# Patient Record
Sex: MALE | Race: OTHER | Employment: OTHER | URBAN - METROPOLITAN AREA
[De-identification: names, ages, dates, MRNs, and addresses within clinical notes are randomized per-mention and may not be internally consistent; named-entity substitution may affect disease eponyms.]

---

## 2020-12-07 ENCOUNTER — HOSPITAL ENCOUNTER (EMERGENCY)
Facility: HOSPITAL | Age: 80
Discharge: HOME | End: 2020-12-07
Attending: EMERGENCY MEDICINE
Payer: MEDICAID

## 2020-12-07 ENCOUNTER — APPOINTMENT (OUTPATIENT)
Dept: CT IMAGING | Facility: HOSPITAL | Age: 80
End: 2020-12-07
Attending: EMERGENCY MEDICINE
Payer: MEDICAID

## 2020-12-07 ENCOUNTER — APPOINTMENT (OUTPATIENT)
Dept: GENERAL RADIOLOGY | Facility: HOSPITAL | Age: 80
End: 2020-12-07
Attending: EMERGENCY MEDICINE
Payer: MEDICAID

## 2020-12-07 VITALS
OXYGEN SATURATION: 97 % | HEART RATE: 59 BPM | TEMPERATURE: 99 F | HEIGHT: 67 IN | WEIGHT: 170 LBS | SYSTOLIC BLOOD PRESSURE: 154 MMHG | RESPIRATION RATE: 18 BRPM | BODY MASS INDEX: 26.68 KG/M2 | DIASTOLIC BLOOD PRESSURE: 61 MMHG

## 2020-12-07 DIAGNOSIS — E87.1 HYPONATREMIA: ICD-10-CM

## 2020-12-07 DIAGNOSIS — S20.212A CONTUSION OF LEFT CHEST WALL, INITIAL ENCOUNTER: Primary | ICD-10-CM

## 2020-12-07 DIAGNOSIS — R55 SYNCOPE AND COLLAPSE: ICD-10-CM

## 2020-12-07 DIAGNOSIS — E87.5 HYPERKALEMIA: ICD-10-CM

## 2020-12-07 PROCEDURE — 99285 EMERGENCY DEPT VISIT HI MDM: CPT

## 2020-12-07 PROCEDURE — 96374 THER/PROPH/DIAG INJ IV PUSH: CPT

## 2020-12-07 PROCEDURE — 93010 ELECTROCARDIOGRAM REPORT: CPT

## 2020-12-07 PROCEDURE — 80053 COMPREHEN METABOLIC PANEL: CPT | Performed by: EMERGENCY MEDICINE

## 2020-12-07 PROCEDURE — 85025 COMPLETE CBC W/AUTO DIFF WBC: CPT | Performed by: EMERGENCY MEDICINE

## 2020-12-07 PROCEDURE — 93005 ELECTROCARDIOGRAM TRACING: CPT

## 2020-12-07 PROCEDURE — 71101 X-RAY EXAM UNILAT RIBS/CHEST: CPT | Performed by: EMERGENCY MEDICINE

## 2020-12-07 PROCEDURE — 70450 CT HEAD/BRAIN W/O DYE: CPT | Performed by: EMERGENCY MEDICINE

## 2020-12-07 RX ORDER — LOSARTAN POTASSIUM 50 MG/1
TABLET ORAL 2 TIMES DAILY
COMMUNITY

## 2020-12-07 RX ORDER — METOPROLOL SUCCINATE 25 MG/1
25 TABLET, EXTENDED RELEASE ORAL DAILY
COMMUNITY

## 2020-12-07 RX ORDER — ASPIRIN 81 MG/1
81 TABLET ORAL DAILY
COMMUNITY

## 2020-12-07 RX ORDER — ATORVASTATIN CALCIUM 20 MG/1
20 TABLET, FILM COATED ORAL NIGHTLY
COMMUNITY

## 2020-12-07 RX ORDER — KETOROLAC TROMETHAMINE 30 MG/ML
15 INJECTION, SOLUTION INTRAMUSCULAR; INTRAVENOUS ONCE
Status: COMPLETED | OUTPATIENT
Start: 2020-12-07 | End: 2020-12-07

## 2020-12-07 RX ORDER — MEMANTINE HYDROCHLORIDE 10 MG/1
10 TABLET ORAL 2 TIMES DAILY
COMMUNITY

## 2020-12-07 RX ORDER — NAPROXEN 500 MG/1
500 TABLET ORAL 2 TIMES DAILY PRN
Qty: 20 TABLET | Refills: 0 | Status: ON HOLD | OUTPATIENT
Start: 2020-12-07 | End: 2020-12-10

## 2020-12-07 NOTE — ED PROVIDER NOTES
Patient Seen in: [facility] Emergency Department      History   Patient presents with:  Fall    Stated Complaint: Fall around 1200 12/6/20 + LOC    HPI    50-year-old male who is primarily Latvian-speaking and his son is helping with translation pre Current:/56   Pulse 60   Temp 98.8 °F (37.1 °C) (Temporal)   Resp 16   Ht 170.2 cm (5' 7\")   Wt 77.1 kg   SpO2 99%   BMI 26.63 kg/m²         Physical Exam  Vitals signs and nursing note reviewed. Chaperone present: Son in room.    Constitutional: ---------                               -----------         ------                     CBC W/ DIFFERENTIAL[[id number]]          Abnormal            Final result                 Please view results for these tests on the individual orders.    URINALYSIS WITH PROCEDURE:  XR RIBS WITH CHEST (3 VIEWS), LEFT  (CPT=71101)  TECHNIQUE:  PA Chest and three views of the ribs were obtained  COMPARISON:  None.   INDICATIONS:  Fall around 1200 12/6/20 + LOC  PATIENT STATED HISTORY: (As transcribed by Technologist)  Per Polly Walter Patient was given a dose of Toradol. He overall is breathing comfortably and has a good O2 saturation. No evidence of any type of hypoxia.   There is no clear cause of the syncope and this could have been a fall or could have been related to perhaps some

## 2020-12-07 NOTE — ED INITIAL ASSESSMENT (HPI)
Per family, pt had an unwitnessed fall around noon yesterday in the shower. Pt had +LOC at this time. Today pt is c/o upper back and chest pain. Pt has small bruise to upper back.

## 2020-12-08 ENCOUNTER — HOSPITAL ENCOUNTER (INPATIENT)
Facility: HOSPITAL | Age: 80
LOS: 1 days | Discharge: HOME HEALTH | DRG: 184 | End: 2020-12-10
Attending: EMERGENCY MEDICINE | Admitting: INTERNAL MEDICINE
Payer: MEDICARE

## 2020-12-08 ENCOUNTER — APPOINTMENT (OUTPATIENT)
Dept: GENERAL RADIOLOGY | Facility: HOSPITAL | Age: 80
DRG: 184 | End: 2020-12-08
Attending: EMERGENCY MEDICINE
Payer: MEDICARE

## 2020-12-08 ENCOUNTER — APPOINTMENT (OUTPATIENT)
Dept: CT IMAGING | Facility: HOSPITAL | Age: 80
DRG: 184 | End: 2020-12-08
Attending: EMERGENCY MEDICINE
Payer: MEDICARE

## 2020-12-08 DIAGNOSIS — S22.42XD CLOSED FRACTURE OF MULTIPLE RIBS OF LEFT SIDE WITH ROUTINE HEALING, SUBSEQUENT ENCOUNTER: ICD-10-CM

## 2020-12-08 DIAGNOSIS — R07.89 CHEST PAIN, ATYPICAL: ICD-10-CM

## 2020-12-08 DIAGNOSIS — R55 SYNCOPE AND COLLAPSE: Primary | ICD-10-CM

## 2020-12-08 PROCEDURE — 74177 CT ABD & PELVIS W/CONTRAST: CPT | Performed by: EMERGENCY MEDICINE

## 2020-12-08 PROCEDURE — 99223 1ST HOSP IP/OBS HIGH 75: CPT | Performed by: INTERNAL MEDICINE

## 2020-12-08 PROCEDURE — 71260 CT THORAX DX C+: CPT | Performed by: EMERGENCY MEDICINE

## 2020-12-08 PROCEDURE — 71045 X-RAY EXAM CHEST 1 VIEW: CPT | Performed by: EMERGENCY MEDICINE

## 2020-12-08 RX ORDER — ASPIRIN 81 MG/1
324 TABLET, CHEWABLE ORAL ONCE
Status: COMPLETED | OUTPATIENT
Start: 2020-12-08 | End: 2020-12-08

## 2020-12-08 RX ORDER — MORPHINE SULFATE 2 MG/ML
2 INJECTION, SOLUTION INTRAMUSCULAR; INTRAVENOUS ONCE
Status: COMPLETED | OUTPATIENT
Start: 2020-12-08 | End: 2020-12-08

## 2020-12-09 ENCOUNTER — APPOINTMENT (OUTPATIENT)
Dept: CV DIAGNOSTICS | Facility: HOSPITAL | Age: 80
DRG: 184 | End: 2020-12-09
Attending: INTERNAL MEDICINE
Payer: MEDICARE

## 2020-12-09 PROBLEM — S22.42XD CLOSED FRACTURE OF MULTIPLE RIBS OF LEFT SIDE WITH ROUTINE HEALING, SUBSEQUENT ENCOUNTER: Status: ACTIVE | Noted: 2020-12-09

## 2020-12-09 PROBLEM — R07.89 CHEST PAIN, ATYPICAL: Status: ACTIVE | Noted: 2020-12-09

## 2020-12-09 PROCEDURE — 93306 TTE W/DOPPLER COMPLETE: CPT | Performed by: INTERNAL MEDICINE

## 2020-12-09 PROCEDURE — 99232 SBSQ HOSP IP/OBS MODERATE 35: CPT | Performed by: INTERNAL MEDICINE

## 2020-12-09 RX ORDER — MEMANTINE HYDROCHLORIDE 10 MG/1
10 TABLET ORAL 2 TIMES DAILY
Status: DISCONTINUED | OUTPATIENT
Start: 2020-12-09 | End: 2020-12-10

## 2020-12-09 RX ORDER — ONDANSETRON 2 MG/ML
4 INJECTION INTRAMUSCULAR; INTRAVENOUS EVERY 6 HOURS PRN
Status: DISCONTINUED | OUTPATIENT
Start: 2020-12-09 | End: 2020-12-10

## 2020-12-09 RX ORDER — ENOXAPARIN SODIUM 100 MG/ML
40 INJECTION SUBCUTANEOUS DAILY
Status: DISCONTINUED | OUTPATIENT
Start: 2020-12-09 | End: 2020-12-10

## 2020-12-09 RX ORDER — SODIUM CHLORIDE 9 MG/ML
INJECTION, SOLUTION INTRAVENOUS CONTINUOUS
Status: DISCONTINUED | OUTPATIENT
Start: 2020-12-09 | End: 2020-12-09

## 2020-12-09 RX ORDER — LOSARTAN POTASSIUM 50 MG/1
50 TABLET ORAL 2 TIMES DAILY
Status: DISCONTINUED | OUTPATIENT
Start: 2020-12-09 | End: 2020-12-10

## 2020-12-09 RX ORDER — ACETAMINOPHEN 325 MG/1
650 TABLET ORAL EVERY 6 HOURS PRN
Status: DISCONTINUED | OUTPATIENT
Start: 2020-12-09 | End: 2020-12-10

## 2020-12-09 RX ORDER — ASPIRIN 81 MG/1
81 TABLET ORAL DAILY
Status: DISCONTINUED | OUTPATIENT
Start: 2020-12-09 | End: 2020-12-10

## 2020-12-09 RX ORDER — ATORVASTATIN CALCIUM 20 MG/1
20 TABLET, FILM COATED ORAL NIGHTLY
Status: DISCONTINUED | OUTPATIENT
Start: 2020-12-09 | End: 2020-12-10

## 2020-12-09 RX ORDER — METOPROLOL SUCCINATE 25 MG/1
25 TABLET, EXTENDED RELEASE ORAL
Status: DISCONTINUED | OUTPATIENT
Start: 2020-12-10 | End: 2020-12-10

## 2020-12-09 RX ORDER — ACETAMINOPHEN AND CODEINE PHOSPHATE 300; 30 MG/1; MG/1
1 TABLET ORAL EVERY 4 HOURS PRN
Status: DISCONTINUED | OUTPATIENT
Start: 2020-12-09 | End: 2020-12-10

## 2020-12-09 NOTE — ED INITIAL ASSESSMENT (HPI)
Pt from [facility], instructed to go to er, pt had ct neg, xray ribs no fractures, pt has 10/10 pain lt side ribs, pt fell last Sunday in shower, pt c/o rib and lt shoulder pain , pt aox4, pt able to walk steady and unassisted at home, pt denies dizziness,

## 2020-12-09 NOTE — PHYSICAL THERAPY NOTE
Order received for Physical Therapy. Patient currently receiving echo at bedside will reattempt PT services as able.

## 2020-12-09 NOTE — PLAN OF CARE
Patient alert and oriented x4, Wolof speaking. On RA. NSR on tele. Continent of bowel and bladder. Complained of Left rib pain, ordered PRN pain medication administered with some relief. POC pain control. Fall precautions in place.  Call light within reac part  Description: INTERVENTIONS:  - Support and protect limb and body alignment per provider's orders  - Instruct and reinforce with patient and family use of appropriate assistive device and precautions (e.g. spinal or hip dislocation precautions)  Alexa Foley

## 2020-12-09 NOTE — H&P
[facility]  History and Physical     Luna Singh Patient Status:  Emergency    5/15/1940 MRN [id number]   Location [facility] Attending David Odom MD   Hosp Day # 0 PCP None Pcp     Chief Complaint: left si (500 mg total) by mouth 2 (two) times daily as needed. , Disp: 20 tablet, Rfl: 0        Review of Systems:   A comprehensive 14 point review of systems was completed. Pertinent positives and negatives noted in the HPI.     Physical Exam:    /72   Pu 5. echo  6. IVF  7. PT OT   8. Fall precaution   2. Chest pain due to multiple rib fractures   1. Pain control   2. IS  3. PTOT  3. Hyponatremia - IVF  Atelectasis   4. HTN   5. HL  6.  Mild cognitive impairment per son he has become more forgetful over t

## 2020-12-09 NOTE — HOME CARE LIAISON
Received referral from [address] [place] [address]. Met with patient and patients son Kenneth at the bedside and provided choice. Patient is agreeable to [facility]. Residential brochure provided with contact information.  All questions addressed and ans

## 2020-12-09 NOTE — PHYSICAL THERAPY NOTE
PHYSICAL THERAPY EVALUATION - INPATIENT     Room Number: 2609/2609-A  Evaluation Date: 12/9/2020  Type of Evaluation: Initial  Physician Order: PT Eval and Treat    Presenting Problem: s/p fall admit with chest wall pain  Reason for Therapy: Mobility follows multistep commands with repetition  · Safety Judgement:  decreased awareness of need for safety    RANGE OF MOTION AND STRENGTH ASSESSMENT  Upper extremity ROM is WFL    Lower extremity ROM is within functional limits     Lower extremity strength i with cga for balance. Discussed with patient and son need to be up n chair and ambulate on unit with rw and assist and rw and HHPT for discharge.      Exercise/Education Provided:  Bed mobility  Body mechanics  Gait training  Transfer training  discharge p supervision  To rw   Goal #3 Patient is able to ambulate 200 feet with assist device: walker - rolling at assistance level: supervision     Goal #4    Goal #5    Goal #6    Goal Comments: Goals established on 12/9/2020  *Therapist wore surgical mask, glove

## 2020-12-09 NOTE — ED PROVIDER NOTES
Patient Seen in: [facility] Emergency Department      History   Patient presents with:  Trauma    Stated Complaint: rib pain, seen here yesterday for syncope/fall    HPI    Patient is an 79-year-old male does not take blood thinners as per patient's Vitals [12/08/20 1826]   BP (!) 175/65   Pulse 61   Resp 18   Temp 97.9 °F (36.6 °C)   Temp src Temporal   SpO2 95 %   O2 Device None (Room air)       Current:/72   Pulse 64   Temp 97.9 °F (36.6 °C) (Temporal)   Resp 19   Wt 77.1 kg   SpO2 100%   BMI (*)     BUN 24 (*)     GFR, Non-African American 52 (*)     Alkaline Phosphatase 39 (*)     All other components within normal limits   CBC W/ DIFFERENTIAL - Abnormal; Notable for the following components:    RBC 3.71 (*)     HGB 11.5 (*)     HCT 34.1 (*) mesenteric fat. No evidence for bowel obstruction or strangulation. 7. Mild compression fracture deformity of L4 of indeterminate age. Correlate with physical exam and history.     Dictated by (CST): Oswaldo Vogt MD on 12/08/2020 at 11:12 PM     Fin diagnosis)  Chest pain, atypical  Closed fracture of multiple ribs of left side with routine healing, subsequent encounter    Disposition:  Admit  12/8/2020 11:06 pm    Follow-up:  No follow-up provider specified.         Medications Prescribed:  Current Phil Ortiz

## 2020-12-09 NOTE — PROGRESS NOTES
[facility]  Progress Note     Barbara Carrillo Patient Status:  Inpatient    5/15/1940 MRN [id number]   [facility] 2NE-A Attending Fabien Belcher, DO   Hosp Day # 0 PCP None Pcp     Chief Complaint:  Left sided chest wall pain    m precaution   2. Chest pain due to multiple rib fractures   1. Pain control   2. IS/ flutter   3. PTOT  eval P   3. Hyponatremia -  Na 134   Atelectasis   4. HTN   5. HL  6.  Mild cognitive impairment per son he has become more forgetful over the recent year

## 2020-12-09 NOTE — CM/SW NOTE
12/09/20 1100   CM/SW Referral Data   Referral Source Physician   Reason for Referral Discharge planning   Informant Children   Patient Info   Patient's Mental Status Alert;Oriented   Patient Communication Issues Language barrier   Patient's Home [employer]

## 2020-12-10 VITALS
HEART RATE: 59 BPM | SYSTOLIC BLOOD PRESSURE: 157 MMHG | OXYGEN SATURATION: 97 % | WEIGHT: 170 LBS | RESPIRATION RATE: 18 BRPM | BODY MASS INDEX: 27 KG/M2 | DIASTOLIC BLOOD PRESSURE: 55 MMHG | TEMPERATURE: 98 F

## 2020-12-10 PROCEDURE — 99239 HOSP IP/OBS DSCHRG MGMT >30: CPT | Performed by: INTERNAL MEDICINE

## 2020-12-10 RX ORDER — HYDROMORPHONE HYDROCHLORIDE 1 MG/ML
0.3 INJECTION, SOLUTION INTRAMUSCULAR; INTRAVENOUS; SUBCUTANEOUS EVERY 2 HOUR PRN
Status: DISCONTINUED | OUTPATIENT
Start: 2020-12-10 | End: 2020-12-10

## 2020-12-10 RX ORDER — AMLODIPINE BESYLATE 2.5 MG/1
2.5 TABLET ORAL 2 TIMES DAILY
COMMUNITY

## 2020-12-10 RX ORDER — ACETAMINOPHEN 500 MG
1000 TABLET ORAL EVERY 6 HOURS
Status: DISCONTINUED | OUTPATIENT
Start: 2020-12-10 | End: 2020-12-10

## 2020-12-10 RX ORDER — HYDROCODONE BITARTRATE AND ACETAMINOPHEN 7.5; 325 MG/1; MG/1
1-2 TABLET ORAL EVERY 6 HOURS PRN
Qty: 25 TABLET | Refills: 0 | Status: SHIPPED | OUTPATIENT
Start: 2020-12-10

## 2020-12-10 RX ORDER — OXYCODONE HYDROCHLORIDE 5 MG/1
5 TABLET ORAL EVERY 4 HOURS PRN
Status: DISCONTINUED | OUTPATIENT
Start: 2020-12-10 | End: 2020-12-10

## 2020-12-10 RX ORDER — HYDROMORPHONE HYDROCHLORIDE 1 MG/ML
0.6 INJECTION, SOLUTION INTRAMUSCULAR; INTRAVENOUS; SUBCUTANEOUS EVERY 2 HOUR PRN
Status: DISCONTINUED | OUTPATIENT
Start: 2020-12-10 | End: 2020-12-10

## 2020-12-10 NOTE — PLAN OF CARE
Assumed care at 1900, pt resting in bed w son at bedside. A&Ox4, forgetful. Indonesian speaking. Denies SOB, O2 sat WNL on RA. Denies chest pain, NSR on tele. Complaining of pain on left side. Tylenol #3 given. SCDs off, lovenox scheduled.  [facility] MUSCULOSKELETAL - ADULT  Goal: Return mobility to safest level of function  Description: INTERVENTIONS:  - Assess patient stability and activity tolerance for standing, transferring and ambulating w/ or w/o assistive devices  - Assist with transfers and am or patient reports new pain  - Anticipate increased pain with activity and pre-medicate as appropriate  Outcome: Progressing

## 2020-12-10 NOTE — PROGRESS NOTES
[facility]  Progress Note     Meet Forth Patient Status:  Inpatient    5/15/1940 MRN [id number]   [facility] 2NE-A Attending Sergey Hurley DO   Hosp Day # 1 PCP None Pcp     Chief Complaint:  Left sided chest wall pain    m 9. 2   ALB 3.6 3.9  --   --    * 130* 134* 131*   K 5.3* 5.1 4.5 4.8    101 102 98   CO2 25.0 24.0 28.0 27.0   ALKPHO 42* 39*  --   --    AST 18 19  --   --    ALT 25 24  --   --    BILT 0.7 1.0  --   --    TP 7.2 7.5  --   --      Estimated Cre was  normal.   5. Pulmonary arteries: Systolic pressure was mildly increased, estimated to be 37mm Hg.      Home with Norco can use Motrin or Tylenol also for pain  Blood pressure is been on the higher side will add low-dose Norvasc per discussion with phar

## 2020-12-10 NOTE — PLAN OF CARE
Sinus rhythm on tele  deny chest pain verbalized pain on left side of chest deny dyspnea  Per patient pain is just achiness walk around hallways on room air tolerated activity well  Good appetite  seen by jessica Horton  for discharge today  Discharge patient/family  Outcome: Progressing     Problem: MUSCULOSKELETAL - ADULT  Goal: Maintain proper alignment of affected body part  Description: INTERVENTIONS:  - Support and protect limb and body alignment per provider's orders  - Instruct and reinforce wit

## 2020-12-10 NOTE — BH PROGRESS NOTE
Discharge instructions completed w/ printed avs and prescriptions given to patient-verbalized understanding  [phone] discharge home accompanied by transporter

## 2020-12-11 ENCOUNTER — PATIENT OUTREACH (OUTPATIENT)
Dept: CASE MANAGEMENT | Age: 80
End: 2020-12-11

## 2020-12-11 NOTE — DISCHARGE SUMMARY
[facility] HOSPITALIST  DISCHARGE SUMMARY     Joel Bates Patient Status:  Inpatient    5/15/1940 MRN [id number]   [facility] 2NE-A Attending No att. providers found   Hosp Day # 1 PCP None Pcp     Date of Admission: 2020  Date of Dis pharmacy per his investigation patient was to start Norvasc 2.5 mg will have him started he said physician was from [facility] I do not know if he is practicing at the [facility]. We will have him fill prescription that he already has.       Elidia+ Score: 50  59-90 High daily.   Quantity: 30 patch  Refills: 0        CONTINUE taking these medications      Instructions Prescription details   amLODIPine Besylate 2.5 MG Tabs  Commonly known as: NORVASC      Take 2.5 mg by mouth daily.    Refills: 0     aspirin 81 MG Tbec edema.  -----------------------------------------------------------------------------------------------  PATIENT DISCHARGE INSTRUCTIONS: See electronic chart    Lori Cowart NP    Time spent:  > 30 minutes

## 2020-12-11 NOTE — PROGRESS NOTES
Attempted to reach the patient to complete a Hospital FU call. Left a message, with the use of the language line, for the pt to call NCM back at, [phone]. The number for the [facility] was also given to the patient to schedule at, [phone].

## 2020-12-14 NOTE — PROGRESS NOTES
NCM attempted to reach patient for HFU call, left VM on Hayti's phone patient's son, requesting a call back to [phone] with a condition update and also requesting patient call's [facility] clinic at [phone] to schedule a HFU appointment.

## 2020-12-18 ENCOUNTER — OFFICE VISIT (OUTPATIENT)
Dept: INTERNAL MEDICINE CLINIC | Facility: CLINIC | Age: 80
End: 2020-12-18
Payer: MEDICAID

## 2020-12-18 VITALS
BODY MASS INDEX: 25.27 KG/M2 | DIASTOLIC BLOOD PRESSURE: 58 MMHG | HEART RATE: 56 BPM | TEMPERATURE: 98 F | WEIGHT: 161 LBS | SYSTOLIC BLOOD PRESSURE: 162 MMHG | OXYGEN SATURATION: 98 % | RESPIRATION RATE: 18 BRPM | HEIGHT: 67 IN

## 2020-12-18 DIAGNOSIS — R56.9 SEIZURE (HCC): ICD-10-CM

## 2020-12-18 DIAGNOSIS — R55 SYNCOPE AND COLLAPSE: ICD-10-CM

## 2020-12-18 DIAGNOSIS — S22.42XA CLOSED FRACTURE OF MULTIPLE RIBS OF LEFT SIDE, INITIAL ENCOUNTER: ICD-10-CM

## 2020-12-18 DIAGNOSIS — E87.1 HYPONATREMIA: Primary | ICD-10-CM

## 2020-12-18 PROCEDURE — 3077F SYST BP >= 140 MM HG: CPT | Performed by: NURSE PRACTITIONER

## 2020-12-18 PROCEDURE — 85027 COMPLETE CBC AUTOMATED: CPT | Performed by: NURSE PRACTITIONER

## 2020-12-18 PROCEDURE — 99203 OFFICE O/P NEW LOW 30 MIN: CPT | Performed by: NURSE PRACTITIONER

## 2020-12-18 PROCEDURE — 3078F DIAST BP <80 MM HG: CPT | Performed by: NURSE PRACTITIONER

## 2020-12-18 PROCEDURE — 80048 BASIC METABOLIC PNL TOTAL CA: CPT | Performed by: NURSE PRACTITIONER

## 2020-12-18 PROCEDURE — 3008F BODY MASS INDEX DOCD: CPT | Performed by: NURSE PRACTITIONER

## 2020-12-18 PROCEDURE — 1111F DSCHRG MED/CURRENT MED MERGE: CPT | Performed by: NURSE PRACTITIONER

## 2020-12-18 RX ORDER — LEVETIRACETAM 500 MG/1
250 TABLET ORAL 2 TIMES DAILY
COMMUNITY
Start: 2020-12-18

## 2020-12-18 NOTE — PROGRESS NOTES
TRANSITIONAL CARE CLINIC PHARMACIST MEDICATION RECONCILIATION        [facility] Comment MRN [id number]    5/15/1940 PCP None Pcp       Comments: Medication history completed by the [address] [place] [address] Pharmacist with the patient and son in the office prescribed. Patient blood pressure was 162/58 in the office today. APN decided since the patient was taking the Amlodipine 2.5mg twice daily to continue that dose with his elevated systolic blood pressure.   Patient had a telephone visit with a neurologis

## 2020-12-18 NOTE — PATIENT INSTRUCTIONS
I will call w/ lab results later today   F/u w/ [facility] system for PCP     Keep  Norvasc 2.5 mg   In am and pm    Wean Norco as pain decreases       Change to tylenol if needed for pain

## 2020-12-18 NOTE — PROGRESS NOTES
Vicente Cowan 6      HISTORY   CHIEF COMPLAINT: Follow-up after mechanical fall  HPI: Johnynoa Moe is a 79year old male here today for hospital follow up for mechanical fall fractured ribs.   Johny Moe was History    Tobacco Use      Smoking status: Never Smoker      Smokeless tobacco: Never Used    Alcohol use: Never      Frequency: Never    Drug use: Never       Imaging & Consults:  Ct Brain Or Head (90050)    Result Date: 12/7/2020  CONCLUSION:  1.  No acu 12/7/2020  CONCLUSION:  1. Mild bibasilar subsegmental atelectatic changes. 2. No acute process. No evidence of focal osseous injury. Specifically, no discrete rib fractures are identified.     Dictated by (CST): Daija Zaragoza DO on 12/07/2020 at 2:49 PM denies history of anemia, bruising, bleeding  ENDOCRINE: denies increased or decreased appetite, sudden changes in weight, intolerance to heat or cold  ALLERGY/IMMUNOLOGY: denies angioedema, hives, itchy eyes, sneezing, hx of allergies or asthma    PHYSICA next Wednesday with follow-up in [facility] clinic    Hemoglobin improved 11 today. Spoke with grandson this afternoon, he has written instructions down will relay to his mother who does the medications for Uziel.   Spoke with the son Kenneth  at [phone] h assumption/resumption of care  ? Education given to patient and family on self-management, independent living, and activities of daily living. ?  Referrals as listed below in orders Assisted in scheduling required follow-up with community providers and ser

## 2020-12-21 ENCOUNTER — TELEPHONE (OUTPATIENT)
Dept: INTERNAL MEDICINE CLINIC | Facility: CLINIC | Age: 80
End: 2020-12-21

## 2020-12-21 DIAGNOSIS — E87.1 HYPONATREMIA: Primary | ICD-10-CM

## 2020-12-21 NOTE — TELEPHONE ENCOUNTER
Spoke with patient's son today regarding need for follow up BMP. Son states patient is seeing a PCP on Wednesday. Son is unsure who PCP is. BMP is recommended on Wednesday 12/23/2020.   Son states he will call the [facility] back with the name of the PCP so it

## 2020-12-22 NOTE — PROGRESS NOTES
Multiple attempts to contact the pt for a HFU without a patient call back. The patient was seen for a Hospital FU with the T[facility] on 12/18/2020. NCM closing encounter.

## 2020-12-23 NOTE — TELEPHONE ENCOUNTER
Pt's son returned call, states pt has appt with PCP Dr Mandy Cohen today at 11:00am. Jatinder Callow son to ask PCP to draw BMP, son verbalized intent to comply

## 2024-05-25 ENCOUNTER — HOSPITAL ENCOUNTER (INPATIENT)
Facility: HOSPITAL | Age: 84
LOS: 3 days | Discharge: HOME HEALTH | End: 2024-05-28
Attending: EMERGENCY MEDICINE | Admitting: HOSPITALIST
Payer: MEDICARE

## 2024-05-25 ENCOUNTER — APPOINTMENT (OUTPATIENT)
Dept: CT IMAGING | Facility: HOSPITAL | Age: 84
End: 2024-05-25
Attending: EMERGENCY MEDICINE
Payer: MEDICARE

## 2024-05-25 ENCOUNTER — APPOINTMENT (OUTPATIENT)
Dept: GENERAL RADIOLOGY | Facility: HOSPITAL | Age: 84
End: 2024-05-25
Attending: EMERGENCY MEDICINE
Payer: MEDICARE

## 2024-05-25 ENCOUNTER — HOSPITAL ENCOUNTER (INPATIENT)
Facility: HOSPITAL | Age: 84
LOS: 3 days | Discharge: HOME | End: 2024-05-28
Attending: EMERGENCY MEDICINE | Admitting: HOSPITALIST
Payer: MEDICARE

## 2024-05-25 DIAGNOSIS — R26.9 GAIT DISTURBANCE: ICD-10-CM

## 2024-05-25 DIAGNOSIS — I62.9 INTRACRANIAL HEMORRHAGE (HCC): Primary | ICD-10-CM

## 2024-05-25 LAB
ALBUMIN SERPL-MCNC: 4.1 G/DL (ref 3.4–5)
ALBUMIN/GLOB SERPL: 1.1 {RATIO} (ref 1–2)
ALP LIVER SERPL-CCNC: 59 U/L
ALT SERPL-CCNC: 24 U/L
ANION GAP SERPL CALC-SCNC: 7 MMOL/L (ref 0–18)
AST SERPL-CCNC: 26 U/L (ref 15–37)
ATRIAL RATE: 75 BPM
BASOPHILS # BLD AUTO: 0.05 X10(3) UL (ref 0–0.2)
BASOPHILS NFR BLD AUTO: 0.6 %
BILIRUB SERPL-MCNC: 0.6 MG/DL (ref 0.1–2)
BILIRUB UR QL STRIP.AUTO: NEGATIVE
BUN BLD-MCNC: 27 MG/DL (ref 9–23)
CALCIUM BLD-MCNC: 9.9 MG/DL (ref 8.5–10.1)
CHLORIDE SERPL-SCNC: 103 MMOL/L (ref 98–112)
CLARITY UR REFRACT.AUTO: CLEAR
CO2 SERPL-SCNC: 25 MMOL/L (ref 21–32)
COLOR UR AUTO: YELLOW
CREAT BLD-MCNC: 1.87 MG/DL
EGFRCR SERPLBLD CKD-EPI 2021: 35 ML/MIN/1.73M2 (ref 60–?)
EOSINOPHIL # BLD AUTO: 0.06 X10(3) UL (ref 0–0.7)
EOSINOPHIL NFR BLD AUTO: 0.7 %
ERYTHROCYTE [DISTWIDTH] IN BLOOD BY AUTOMATED COUNT: 12.7 %
EST. AVERAGE GLUCOSE BLD GHB EST-MCNC: 137 MG/DL (ref 68–126)
GLOBULIN PLAS-MCNC: 3.9 G/DL (ref 2.8–4.4)
GLUCOSE BLD-MCNC: 106 MG/DL (ref 70–99)
GLUCOSE BLD-MCNC: 132 MG/DL (ref 70–99)
GLUCOSE BLD-MCNC: 150 MG/DL (ref 70–99)
GLUCOSE UR STRIP.AUTO-MCNC: NORMAL MG/DL
HBA1C MFR BLD: 6.4 % (ref ?–5.7)
HCT VFR BLD AUTO: 37.9 %
HGB BLD-MCNC: 12.5 G/DL
IMM GRANULOCYTES # BLD AUTO: 0.02 X10(3) UL (ref 0–1)
IMM GRANULOCYTES NFR BLD: 0.2 %
KETONES UR STRIP.AUTO-MCNC: NEGATIVE MG/DL
LEUKOCYTE ESTERASE UR QL STRIP.AUTO: NEGATIVE
LYMPHOCYTES # BLD AUTO: 0.83 X10(3) UL (ref 1–4)
LYMPHOCYTES NFR BLD AUTO: 10 %
MCH RBC QN AUTO: 31.3 PG (ref 26–34)
MCHC RBC AUTO-ENTMCNC: 33 G/DL (ref 31–37)
MCV RBC AUTO: 94.8 FL
MONOCYTES # BLD AUTO: 0.72 X10(3) UL (ref 0.1–1)
MONOCYTES NFR BLD AUTO: 8.7 %
NEUTROPHILS # BLD AUTO: 6.59 X10 (3) UL (ref 1.5–7.7)
NEUTROPHILS # BLD AUTO: 6.59 X10(3) UL (ref 1.5–7.7)
NEUTROPHILS NFR BLD AUTO: 79.8 %
NITRITE UR QL STRIP.AUTO: NEGATIVE
OSMOLALITY SERPL CALC.SUM OF ELEC: 287 MOSM/KG (ref 275–295)
P AXIS: 38 DEGREES
P-R INTERVAL: 162 MS
PH UR STRIP.AUTO: 7.5 [PH] (ref 5–8)
PLATELET # BLD AUTO: 273 10(3)UL (ref 150–450)
POTASSIUM SERPL-SCNC: 5.5 MMOL/L (ref 3.5–5.1)
PROT SERPL-MCNC: 8 G/DL (ref 6.4–8.2)
PROT UR STRIP.AUTO-MCNC: NEGATIVE MG/DL
Q-T INTERVAL: 362 MS
QRS DURATION: 88 MS
QTC CALCULATION (BEZET): 404 MS
R AXIS: 54 DEGREES
RBC # BLD AUTO: 4 X10(6)UL
RBC UR QL AUTO: NEGATIVE
SODIUM SERPL-SCNC: 135 MMOL/L (ref 136–145)
SP GR UR STRIP.AUTO: 1.01 (ref 1–1.03)
T AXIS: -8 DEGREES
UROBILINOGEN UR STRIP.AUTO-MCNC: NORMAL MG/DL
VENTRICULAR RATE: 75 BPM
WBC # BLD AUTO: 8.3 X10(3) UL (ref 4–11)

## 2024-05-25 PROCEDURE — 70450 CT HEAD/BRAIN W/O DYE: CPT | Performed by: EMERGENCY MEDICINE

## 2024-05-25 PROCEDURE — 71045 X-RAY EXAM CHEST 1 VIEW: CPT | Performed by: EMERGENCY MEDICINE

## 2024-05-25 PROCEDURE — 99223 1ST HOSP IP/OBS HIGH 75: CPT | Performed by: HOSPITALIST

## 2024-05-25 RX ORDER — NICOTINE POLACRILEX 4 MG
15 LOZENGE BUCCAL
Status: DISCONTINUED | OUTPATIENT
Start: 2024-05-25 | End: 2024-05-28

## 2024-05-25 RX ORDER — DEXTROSE MONOHYDRATE 25 G/50ML
50 INJECTION, SOLUTION INTRAVENOUS
Status: DISCONTINUED | OUTPATIENT
Start: 2024-05-25 | End: 2024-05-28

## 2024-05-25 RX ORDER — ECHINACEA PURPUREA EXTRACT 125 MG
1 TABLET ORAL
Status: DISCONTINUED | OUTPATIENT
Start: 2024-05-25 | End: 2024-05-28

## 2024-05-25 RX ORDER — QUETIAPINE FUMARATE 25 MG/1
25 TABLET, FILM COATED ORAL NIGHTLY PRN
Status: DISCONTINUED | OUTPATIENT
Start: 2024-05-25 | End: 2024-05-28

## 2024-05-25 RX ORDER — GLIPIZIDE 5 MG/1
5 TABLET ORAL DAILY
COMMUNITY

## 2024-05-25 RX ORDER — ACETAMINOPHEN 500 MG
500 TABLET ORAL EVERY 4 HOURS PRN
Status: DISCONTINUED | OUTPATIENT
Start: 2024-05-25 | End: 2024-05-28

## 2024-05-25 RX ORDER — DEXTROSE MONOHYDRATE AND SODIUM CHLORIDE 5; .45 G/100ML; G/100ML
INJECTION, SOLUTION INTRAVENOUS CONTINUOUS
Status: ACTIVE | OUTPATIENT
Start: 2024-05-25 | End: 2024-05-25

## 2024-05-25 RX ORDER — MEMANTINE HYDROCHLORIDE 10 MG/1
10 TABLET ORAL 2 TIMES DAILY
Status: DISCONTINUED | OUTPATIENT
Start: 2024-05-25 | End: 2024-05-28

## 2024-05-25 RX ORDER — NICOTINE POLACRILEX 4 MG
30 LOZENGE BUCCAL
Status: DISCONTINUED | OUTPATIENT
Start: 2024-05-25 | End: 2024-05-28

## 2024-05-25 RX ORDER — OLANZAPINE 5 MG/1
5 TABLET, ORALLY DISINTEGRATING ORAL EVERY 2 HOUR PRN
Status: DISCONTINUED | OUTPATIENT
Start: 2024-05-25 | End: 2024-05-27

## 2024-05-25 RX ORDER — MELATONIN
3 NIGHTLY PRN
Status: DISCONTINUED | OUTPATIENT
Start: 2024-05-25 | End: 2024-05-28

## 2024-05-25 RX ORDER — ATORVASTATIN CALCIUM 20 MG/1
20 TABLET, FILM COATED ORAL NIGHTLY
Status: DISCONTINUED | OUTPATIENT
Start: 2024-05-25 | End: 2024-05-28

## 2024-05-25 RX ORDER — LEVETIRACETAM 250 MG/1
250 TABLET ORAL 2 TIMES DAILY
Status: DISCONTINUED | OUTPATIENT
Start: 2024-05-25 | End: 2024-05-28

## 2024-05-25 RX ORDER — LOSARTAN POTASSIUM 50 MG/1
50 TABLET ORAL DAILY
Status: DISCONTINUED | OUTPATIENT
Start: 2024-05-26 | End: 2024-05-28

## 2024-05-25 RX ORDER — ACETAMINOPHEN 500 MG
1000 TABLET ORAL ONCE
Status: COMPLETED | OUTPATIENT
Start: 2024-05-25 | End: 2024-05-25

## 2024-05-25 RX ORDER — ONDANSETRON 2 MG/ML
4 INJECTION INTRAMUSCULAR; INTRAVENOUS EVERY 6 HOURS PRN
Status: DISCONTINUED | OUTPATIENT
Start: 2024-05-25 | End: 2024-05-28

## 2024-05-25 RX ORDER — AMLODIPINE BESYLATE 2.5 MG/1
2.5 TABLET ORAL 2 TIMES DAILY
Status: DISCONTINUED | OUTPATIENT
Start: 2024-05-25 | End: 2024-05-28

## 2024-05-25 RX ORDER — METOPROLOL SUCCINATE 25 MG/1
25 TABLET, EXTENDED RELEASE ORAL
Status: DISCONTINUED | OUTPATIENT
Start: 2024-05-26 | End: 2024-05-28

## 2024-05-25 RX ORDER — OLANZAPINE 2.5 MG/1
5 TABLET, FILM COATED ORAL EVERY 12 HOURS
Status: DISCONTINUED | OUTPATIENT
Start: 2024-05-26 | End: 2024-05-26

## 2024-05-25 NOTE — PLAN OF CARE
NURSING ADMISSION NOTE    Patient admitted via Cart  Oriented to room.  Safety precautions initiated.  Bed in low position.  Call light in reach.    Son at bedside. Pt alert and oriented x1, baseline  Restless, agitated and attempting to get out of bed and pulling at pulse ox. Sitter ordered  Son that lives with pt will be here tonight to help with the admission navigator  Tolerating PO intake, diet  Orders for qshift neuros. Pt unable to follow some commands but strength 5/5  Transfer to CTU 3. Report given to Lucille GRIFFIN

## 2024-05-25 NOTE — ED QUICK NOTES
Orders for admission, patient is aware of plan and ready to go upstairs. Any questions, please call ED RN Babita  at extension [phone].     Patient Covid vaccination status: Unvaccinated     COVID Test Ordered in ED: None    COVID Suspicion at Admission: N/A    Running Infusions:  None    Mental Status/LOC at time of transport: x1 self.    Other pertinent information: Belarusian SPEAKING ONLY. HX OF DEMENTIA  CIWA score: N/A   NIH score:  N/A

## 2024-05-25 NOTE — ED INITIAL ASSESSMENT (HPI)
Pt brought in by family. 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 Hx dementia, baseline orientation is x1, self. Since fall pt is not sleeping, with increased agitation. Pt also reports R sided headache. Denies NV.

## 2024-05-25 NOTE — PROGRESS NOTES
NURSING ADMISSION NOTE      Patient admitted via Cart  Oriented to room.  Safety precautions initiated.  Bed in low position.  Call light in reach.  Pt's son at bedside

## 2024-05-25 NOTE — H&P
[facility]     REKHA Meza Patient Status:  Emergency    5/15/1940 MRN [id number]   Location [facility] EMERGENCY DEPARTMENT Attending Tripp Stone MD   Hosp Day # 0 PCP None Pcp     Chief Complaint: recent fall, weakness, recent ICH    Subjective:    History of Present Illness:     REKHA Meza is a 84 year old male with past medical history significant for HTN, DL, dementia who presents with worsening confusion and weakness.  Pt was apparently in [place] and fell.  He suffered a IVH and was hospitalized for about 2 weeks.  His family brought him back from [place] overnight and brought him into the ER.  He is agitated and confused.  He is unable to ambulate.  Family are concerned and do not know what to do with him at home.    History/Other:    Past Medical History:  Past Medical History:    Dementia (HCC)    Essential hypertension    Hyperlipidemia     Past Surgical History:   History reviewed. No pertinent surgical history.   Family History:   No family history on file.  Social History:    reports that he has never smoked. He has never used smokeless tobacco. He reports that he does not drink alcohol and does not use drugs.     Allergies: No Known Allergies    Medications:    No current facility-administered medications on file prior to encounter.     Current Outpatient Medications on File Prior to Encounter   Medication Sig Dispense Refill    levETIRAcetam 500 MG Oral Tab Take 250 mg by mouth 2 (two) times daily.      lidocaine-menthol 4-1 % External Patch Place 1 patch onto the skin daily. (Patient not taking: Reported on 2020) 30 patch 0    HYDROcodone-acetaminophen 7.5-325 MG Oral Tab Take 1-2 tablets by mouth every 6 (six) hours as needed for Pain. (Patient not taking: Reported on 2024) 25 tablet 0    amLODIPine Besylate 2.5 MG Oral Tab Take 2.5 mg by mouth 2 (two) times a day.        aspirin 81 MG Oral Tab EC Take 81 mg by mouth daily. (Patient not  taking: Reported on 5/25/2024)      Memantine HCl 10 MG Oral Tab Take 10 mg by mouth 2 (two) times daily.      losartan Potassium 50 MG Oral Tab Take by mouth 2 (two) times daily.      atorvastatin 20 MG Oral Tab Take 20 mg by mouth nightly.      Metoprolol Succinate ER 25 MG Oral Tablet 24 Hr Take 25 mg by mouth daily.         Review of Systems:   A comprehensive review of systems was completed.    Pertinent positives and negatives noted in the HPI.    Objective:   Physical Exam:    /73   Pulse 72   Resp 14   Ht 5' 7\" (1.702 m)   Wt 140 lb (63.5 kg)   SpO2 98%   BMI 21.93 kg/m²   General: Agitated, restless  Respiratory: No rhonchi, no wheezes  Cardiovascular: S1, S2. Regular rate and rhythm  Abdomen: Soft, Non-tender, non-distended, positive bowel sounds  Neuro: No new focal deficits  Extremities: No edema      Results:    Labs:      Labs Last 24 Hours:    Recent Labs   Lab 05/25/24  1203   RBC 4.00   HGB 12.5*   HCT 37.9*   MCV 94.8   MCH 31.3   MCHC 33.0   RDW 12.7   NEPRELIM 6.59   WBC 8.3   .0       Recent Labs   Lab 05/25/24  1203   *   BUN 27*   CREATSERUM 1.87*   EGFRCR 35*   CA 9.9   ALB 4.1   *   K 5.5*      CO2 25.0   ALKPHO 59   AST 26   ALT 24   BILT 0.6   TP 8.0       Lab Results   Component Value Date    INR 0.98 12/08/2020       No results for input(s): \"TROP\", \"TROPHS\", \"CK\" in the last 168 hours.    No results for input(s): \"TROP\", \"PBNP\" in the last 168 hours.    No results for input(s): \"PCT\" in the last 168 hours.    Imaging: Imaging data reviewed in Epic.    Assessment & Plan:      #Recent fall with IVH  PT/OT/ST when able  Avoid blood thinners, antiplatelets  Cont empiric Keppra  Possible placement    #Acute encephalopathy on chronic dementia, likely delirium due to above  Zyprexa PRN  May need to be restrained  Psychiatry eval, likely need geriatric unit    #DMII, hyperglycemia protocol    #HTN, controlled    #DL, statin    #REBEKAH on CKD, gentle IVF, f/u  BMP in am        Plan of care discussed with pt and RN.    Sheri Solis MD    Supplementary Documentation:     The 21st Century Cures Act makes medical notes like these available to patients in the interest of transparency. Please be advised this is a medical document. Medical documents are intended to carry relevant information, facts as evident, and the clinical opinion of the practitioner. The medical note is intended as peer to peer communication and may appear blunt or direct. It is written in medical language and may contain abbreviations or verbiage that are unfamiliar.

## 2024-05-25 NOTE — ED PROVIDER NOTES
Patient Seen in: [facility]      History     Chief Complaint   Patient presents with    Headache    Neurologic Problem     Stated Complaint: had a hemoragic stroke last saturday while he was in [place], not sleeping, incr*    Subjective:   HPI    Patient was brought in by family members with acute change in his neurologic function.  The patient was brought in by family members directly from the airport where patient return from [place].  The patient a hemorrhagic stroke involving the right frontal lobe in [place] 2 weeks ago.  The patient was hospitalized there and then subsequently discharged without any specific plan on ongoing management or supportive care.  The family states that he does have a history of dementia, but his cognition has significantly worsened since the hemorrhagic stroke.  Additionally, the patient is having quite significant difficulty ambulating.  Patient's head CT from [place] was reviewed and there appears to be a sizable right frontal parenchymal hemorrhage noted.    Objective:   Past Medical History:    Dementia (HCC)    Essential hypertension    Hyperlipidemia              History reviewed. No pertinent surgical history.             Social History     Socioeconomic History    Marital status:    Tobacco Use    Smoking status: Never    Smokeless tobacco: Never   Vaping Use    Vaping status: Never Used   Substance and Sexual Activity    Alcohol use: Never    Drug use: Never     Social Determinants of Health      Received from [facility]    Social Connections    Received from [facility]    Housing Stability              Review of Systems    Positive for stated complaint: had a hemoragic stroke last saturday while he was in [place], not sleeping, incr*  Other systems are as noted in HPI.  Constitutional and vital signs reviewed.      All other systems reviewed and negative except as noted above.    Physical Exam     ED  Triage Vitals [05/25/24 1201]   /55   Pulse 70   Resp 15   Temp    Temp src Oral   SpO2 97 %   O2 Device None (Room air)       Current:/67   Pulse 70   Resp 15   Ht 170.2 cm (5' 7\")   Wt 63.5 kg   SpO2 97%   BMI 21.93 kg/m²         Physical Exam  Vitals and nursing note reviewed.   Constitutional:       Appearance: He is well-developed.   HENT:      Head: Normocephalic.   Cardiovascular:      Rate and Rhythm: Normal rate and regular rhythm.      Heart sounds: Murmur heard.      Comments: Systolic murmur is noted.  Patient is noted to have previously noted aortic stenosis on previous notes.  Pulmonary:      Effort: Pulmonary effort is normal.      Breath sounds: Normal breath sounds.   Abdominal:      General: Bowel sounds are normal.      Palpations: Abdomen is soft.      Tenderness: There is no abdominal tenderness. There is no guarding.   Musculoskeletal:         General: No tenderness. Normal range of motion.      Cervical back: Normal range of motion and neck supple.   Lymphadenopathy:      Cervical: No cervical adenopathy.   Skin:     General: Skin is warm and dry.      Findings: No rash.   Neurological:      Mental Status: He is alert.      Comments: Patient is awake, but cognitively appears to have difficulty following even simple commands.  Patient also was noted to have some weakness involving the left lower extremity.  When attempting to ambulate, the patient is very unsteady.  Family states that, 1 month ago, patient was able to ambulate readily independently.  He did have symptoms of dementia previously, but patient's memory and cognition have significantly deteriorated.  Patient recognizes his son, but does not recall his name.              ED Course     Labs Reviewed   COMP METABOLIC PANEL (14) - Abnormal; Notable for the following components:       Result Value    Glucose 132 (*)     Sodium 135 (*)     Potassium 5.5 (*)     BUN 27 (*)     Creatinine 1.87 (*)     eGFR-Cr 35 (*)      All other components within normal limits   HEMOGLOBIN A1C - Abnormal; Notable for the following components:    HgbA1C 6.4 (*)     Estimated Average Glucose 137 (*)     All other components within normal limits   CBC W/ DIFFERENTIAL - Abnormal; Notable for the following components:    HGB 12.5 (*)     HCT 37.9 (*)     Lymphocyte Absolute 0.83 (*)     All other components within normal limits   CBC WITH DIFFERENTIAL WITH PLATELET    Narrative:     The following orders were created for panel order CBC With Differential With Platelet.  Procedure                               Abnormality         Status                     ---------                               -----------         ------                     CBC W/ DIFFERENTIAL[[id number]]          Abnormal            Final result                 Please view results for these tests on the individual orders.   URINALYSIS WITH CULTURE REFLEX   RAINBOW DRAW LAVENDER   RAINBOW DRAW LIGHT GREEN   RAINBOW DRAW BLUE   RAINBOW DRAW GOLD     EKG    Rate, intervals and axes as noted on EKG Report.  Rate: 75  Rhythm: Sinus Rhythm  Reading: T wave inversion in lead III and aVF.  These are noted to be new when compared to EKG performed in December 2020.           ED Course as of 05/25/24 1728  ------------------------------------------------------------  Time: 05/25 1300  Value: CREATININE(!): 1.87  Comment: Patient was noted to have some level of renal insufficiency 3 years ago.  ------------------------------------------------------------  Time: 05/25 1300  Comment: Remainder of patient's lab work was unremarkable.  ------------------------------------------------------------  Time: 05/25 1330  Value: XR CHEST AP PORTABLE  (CPT=71045)  Comment: Images were independently viewed by me and no acute intrapulmonary process was noted.  Mediastinal and cardiac silhouettes were unremarkable.  ------------------------------------------------------------  Time: 05/25 1500  Value: CT BRAIN OR  HEAD (56251)  Comment: CT of the brain was performed to evaluate for differential of acute, interval worsening intracranial hemorrhage.  Images were independently viewed by me and there remained a blush of hyperdensity in the patient's right frontal lobe, but this did appear to be improved when compared to CT scan reviewed from [place].     Medications   dextrose 5%-sodium chloride 0.45% infusion ( Intravenous Not Given 5/25/24 1630)   OLANZapine (ZyPREXA) tab 5 mg (has no administration in time range)   OLANZapine (ZyPREXA Zydis) disintegrating tab 5 mg (has no administration in time range)     Or   OLANZapine (Zyprexa) 5 mg in sterile water for injection (PF) IM injection (has no administration in time range)   metoprolol succinate ER (Toprol XL) 24 hr tab 25 mg (has no administration in time range)   memantine (Namenda) tab 10 mg (has no administration in time range)   losartan (Cozaar) tab 50 mg (has no administration in time range)   levETIRAcetam (Keppra) tab 250 mg (has no administration in time range)   atorvastatin (Lipitor) tab 20 mg (has no administration in time range)   amLODIPine (Norvasc) tab 2.5 mg (has no administration in time range)   glucose (Dex4) 15 GM/59ML oral liquid 15 g (has no administration in time range)     Or   glucose (Glutose) 40% oral gel 15 g (has no administration in time range)     Or   glucose-vitamin C (Dex-4) chewable tab 4 tablet (has no administration in time range)     Or   dextrose 50% injection 50 mL (has no administration in time range)     Or   glucose (Dex4) 15 GM/59ML oral liquid 30 g (has no administration in time range)     Or   glucose (Glutose) 40% oral gel 30 g (has no administration in time range)     Or   glucose-vitamin C (Dex-4) chewable tab 8 tablet (has no administration in time range)   insulin aspart (NovoLOG) 100 Units/mL FlexPen 1-10 Units (has no administration in time range)   acetaminophen (Tylenol Extra Strength) tab 500 mg (has no administration in  time range)   melatonin tab 3 mg (has no administration in time range)   glycerin-hypromellose- (Artificial Tears) 0.2-0.2-1 % ophthalmic solution 1 drop (has no administration in time range)   sodium chloride (Saline Mist) 0.65 % nasal solution 1 spray (has no administration in time range)   ondansetron (Zofran) 4 MG/2ML injection 4 mg (has no administration in time range)   QUEtiapine (SEROquel) tab 25 mg (has no administration in time range)   acetaminophen (Tylenol Extra Strength) tab 1,000 mg (1,000 mg Oral Given 5/25/24 1448)   OLANZapine (Zyprexa) 5 mg in sterile water for injection (PF) IM injection (5 mg Intramuscular Given 5/25/24 8209)       Patient became agitated in the emergency department and Zyprexa was required to address the patient's agitation.         MDM      Patient comes to the emergency department with recent history of intracranial hemorrhage, agitation, change in mentation and inability to ambulate.  Patient just returned back from [place] where he was initially hospitalized for his intracranial hemorrhage.  Here in the emergency department patient clearly was unable to ambulate independently.  Because of this, the patient will be hospitalized for further acute management by hospitalist and likely placement in a rehab facility.  Admission disposition: 5/25/2024  2:55 PM                                        Medical Decision Making      Disposition and Plan     Clinical Impression:  1. Intracranial hemorrhage (HCC)    2. Gait disturbance         Disposition:  Admit  5/25/2024  2:55 pm    Follow-up:  No follow-up provider specified.        Medications Prescribed:  Current Discharge Medication List                           Hospital Problems       Present on Admission  Date Reviewed: 5/25/2024            ICD-10-CM Noted POA    * (Principal) Intracranial hemorrhage (HCC) I62.9 5/25/2024 Yes    Gait disturbance R26.9 5/25/2024 Unknown

## 2024-05-26 PROBLEM — G30.9 ALZHEIMER'S DEMENTIA WITH AGITATION (HCC): Status: ACTIVE | Noted: 2024-05-26

## 2024-05-26 PROBLEM — F02.811 ALZHEIMER'S DEMENTIA WITH AGITATION (HCC): Status: ACTIVE | Noted: 2024-05-26

## 2024-05-26 LAB
ANION GAP SERPL CALC-SCNC: 3 MMOL/L (ref 0–18)
BASOPHILS # BLD AUTO: 0.05 X10(3) UL (ref 0–0.2)
BASOPHILS NFR BLD AUTO: 0.7 %
BUN BLD-MCNC: 20 MG/DL (ref 9–23)
CALCIUM BLD-MCNC: 9.8 MG/DL (ref 8.5–10.1)
CHLORIDE SERPL-SCNC: 108 MMOL/L (ref 98–112)
CO2 SERPL-SCNC: 28 MMOL/L (ref 21–32)
CREAT BLD-MCNC: 1.65 MG/DL
EGFRCR SERPLBLD CKD-EPI 2021: 40 ML/MIN/1.73M2 (ref 60–?)
EOSINOPHIL # BLD AUTO: 0.22 X10(3) UL (ref 0–0.7)
EOSINOPHIL NFR BLD AUTO: 3.2 %
ERYTHROCYTE [DISTWIDTH] IN BLOOD BY AUTOMATED COUNT: 12.6 %
GLUCOSE BLD-MCNC: 105 MG/DL (ref 70–99)
GLUCOSE BLD-MCNC: 115 MG/DL (ref 70–99)
GLUCOSE BLD-MCNC: 115 MG/DL (ref 70–99)
GLUCOSE BLD-MCNC: 133 MG/DL (ref 70–99)
GLUCOSE BLD-MCNC: 135 MG/DL (ref 70–99)
HCT VFR BLD AUTO: 40.1 %
HGB BLD-MCNC: 12.8 G/DL
IMM GRANULOCYTES # BLD AUTO: 0.02 X10(3) UL (ref 0–1)
IMM GRANULOCYTES NFR BLD: 0.3 %
LYMPHOCYTES # BLD AUTO: 1.11 X10(3) UL (ref 1–4)
LYMPHOCYTES NFR BLD AUTO: 16.1 %
MCH RBC QN AUTO: 30.3 PG (ref 26–34)
MCHC RBC AUTO-ENTMCNC: 31.9 G/DL (ref 31–37)
MCV RBC AUTO: 95 FL
MONOCYTES # BLD AUTO: 0.8 X10(3) UL (ref 0.1–1)
MONOCYTES NFR BLD AUTO: 11.6 %
NEUTROPHILS # BLD AUTO: 4.7 X10 (3) UL (ref 1.5–7.7)
NEUTROPHILS # BLD AUTO: 4.7 X10(3) UL (ref 1.5–7.7)
NEUTROPHILS NFR BLD AUTO: 68.1 %
OSMOLALITY SERPL CALC.SUM OF ELEC: 292 MOSM/KG (ref 275–295)
PLATELET # BLD AUTO: 252 10(3)UL (ref 150–450)
POTASSIUM SERPL-SCNC: 5.1 MMOL/L (ref 3.5–5.1)
RBC # BLD AUTO: 4.22 X10(6)UL
SODIUM SERPL-SCNC: 139 MMOL/L (ref 136–145)
WBC # BLD AUTO: 6.9 X10(3) UL (ref 4–11)

## 2024-05-26 PROCEDURE — 99233 SBSQ HOSP IP/OBS HIGH 50: CPT | Performed by: HOSPITALIST

## 2024-05-26 PROCEDURE — 90792 PSYCH DIAG EVAL W/MED SRVCS: CPT | Performed by: OTHER

## 2024-05-26 RX ORDER — MAGNESIUM OXIDE 400 MG (241.3 MG MAGNESIUM) TABLET
1 TABLET NIGHTLY
Status: DISCONTINUED | OUTPATIENT
Start: 2024-05-26 | End: 2024-05-28

## 2024-05-26 RX ORDER — SODIUM CHLORIDE 9 MG/ML
INJECTION, SOLUTION INTRAVENOUS CONTINUOUS
Status: DISCONTINUED | OUTPATIENT
Start: 2024-05-26 | End: 2024-05-28

## 2024-05-26 RX ORDER — QUETIAPINE FUMARATE 25 MG/1
25 TABLET, FILM COATED ORAL NIGHTLY
Status: DISCONTINUED | OUTPATIENT
Start: 2024-05-26 | End: 2024-05-27

## 2024-05-26 NOTE — PLAN OF CARE
Assumed care at 0730.  Patient is confused, oriented to self only, baseline.  Family remains at bedside for translation, assist with pt care.  Pt is [place], impaired vision, Serbian speaking only.  Room air  NSR on tele.  Neuro check q shift.  Pt does not follow directions.  [place] vest and sitter at bedside.  SLT saw pt, rec pureed diet, with thin liquids, crush meds in applesauce.  PT/OT eval  Will continue current plan of care.   Problem: Delirium  Goal: Minimize duration of delirium  Description: Interventions:  - Encourage use of hearing aids, eye glasses  - Promote highest level of mobility daily  - Provide frequent reorientation  - Promote wakefulness i.e. lights on, blinds open  - Promote sleep, encourage patient's normal rest cycle i.e. lights off, TV off, minimize noise and interruptions  - Encourage family to assist in orientation and promotion of home routines  Outcome: Progressing     Problem: Diabetes/Glucose Control  Goal: Glucose maintained within prescribed range  Description: INTERVENTIONS:  - Monitor Blood Glucose as ordered  - Assess for signs and symptoms of hyperglycemia and hypoglycemia  - Administer ordered medications to maintain glucose within target range  - Assess barriers to adequate nutritional intake and initiate nutrition consult as needed  - Instruct patient on self management of diabetes  Outcome: Progressing     Problem: Safety Risk - Non-Violent Restraints  Goal: Patient will remain free from self-harm  Description: INTERVENTIONS:  - Apply the least restrictive restraint to prevent harm  - Notify patient and family of reasons restraints applied  - Assess for any contributing factors to confusion (electrolyte disturbances, delirium, medications)  - Discontinue any unnecessary medical devices as soon as possible  - Assess the patient's physical comfort, circulation, skin condition, hydration, nutrition and elimination needs   - Reorient and redirection as needed  - Assess for the need  to continue restraints  Outcome: Progressing

## 2024-05-26 NOTE — PHYSICAL THERAPY NOTE
Order received, chart reviewed. Communicated with RN. Pt restless, confused, not following commands. Will hold and follow as appropriate.     Sonya Echevarria, PT, DPT  05/26/24

## 2024-05-26 NOTE — PLAN OF CARE
Assumed pt care this evening at 1930.  Pt is A&Ox1, restless and unable to follow commands.   Pt on room air, VSS.  1:1 sitter and posey vest restraint in place for pt safety due to confusion & impulsiveness. Pt needs frequent reminding to stay in bed.  Son is bedside and assists in care.  Accuchecks QID and Charlene checks Qshift.  PRN Seroquil @ Zyprexa given for agitation.  Pt is unsteady, weak, and unable to safely ambulate. PT/OT eval needed.  Pt on carb control diet. Tolerating diet.   IV was pulled out by pt.  Bed in lowest position, call light in reach.

## 2024-05-26 NOTE — DISCHARGE PLANNING
Patient follow-up appointment information:     Visit Type: Stroke Follow-up  Date of TIA/Stroke: 5/19/2024  Type of Stroke: Hemorrhagic  Patient to follow-up: 3 months  OP Neurologist: Any available neurologist  New patient to neurology service: Yes  Anticipated discharge (if known): TBD  Discharge disposition (if known): TBD    Please call Kenneth (son) to schedule the appointment [phone].      RN Stroke Navigator team  [phone]

## 2024-05-26 NOTE — PROGRESS NOTES
[facility]   part of [facility]     Hospitalist Progress Note     REKHA Meza Patient Status:  Inpatient    5/15/1940 MRN [id number]   Location [facility] 3NE-A Attending Sheri Solis MD   Hosp Day # 1 PCP None Pcp     Chief Complaint: Agitation    Subjective:     Patient received Seroquel and Zyprexa, worked with speech therapy, remains agitated.    Objective:    Review of Systems:   Unable to obtain    Vital signs:  Temp:  [97 °F (36.1 °C)-97.9 °F (36.6 °C)] 97.9 °F (36.6 °C)  Pulse:  [68-99] 78  Resp:  [14-21] 15  BP: (109-158)/(40-82) 120/73  SpO2:  [96 %-98 %] 96 %    Physical Exam:    General: No acute distress, restrained, confused  Respiratory: No wheezes, no rhonchi  Cardiovascular: S1, S2, regular rate and rhythm  Abdomen: Soft, Non-tender, non-distended, positive bowel sounds  Neuro: No new focal deficits.   Extremities: No edema      Diagnostic Data:    Labs:  Recent Labs   Lab 24  1203   WBC 8.3   HGB 12.5*   MCV 94.8   .0       Recent Labs   Lab 24  1203   *   BUN 27*   CREATSERUM 1.87*   CA 9.9   ALB 4.1   *   K 5.5*      CO2 25.0   ALKPHO 59   AST 26   ALT 24   BILT 0.6   TP 8.0       Estimated Creatinine Clearance: 25.9 mL/min (A) (based on SCr of 1.87 mg/dL (H)).    No results for input(s): \"TROP\", \"TROPHS\", \"CK\" in the last 168 hours.    No results for input(s): \"PTP\", \"INR\" in the last 168 hours.               Microbiology    No results found for this visit on 24.      Imaging: Reviewed in Epic.    Medications:    OLANZapine  5 mg Oral Q12H    metoprolol succinate ER  25 mg Oral Daily Beta Blocker    memantine  10 mg Oral BID    losartan  50 mg Oral Daily    levETIRAcetam  250 mg Oral BID    atorvastatin  20 mg Oral Nightly    amLODIPine  2.5 mg Oral BID    insulin aspart  1-10 Units Subcutaneous TID AC and HS       Assessment & Plan:      #Recent fall with right frontal ICH  PT/OT/ST when able  Avoid blood thinners,  antiplatelets  Cont empiric Keppra  Possible placement     #Acute encephalopathy on chronic dementia, likely delirium due to above  Zyprexa PRN  May need to be restrained  Psychiatry following, needs inpt psych     #DMII, hyperglycemia protocol, A1c 6.4    #HTN, controlled     #DL, statin    #REBEKAH on CKD, gentle IVF, improved         Sheri Solis MD    Supplementary Documentation:     Quality:  DVT Mechanical Prophylaxis:   SCDs,    DVT Pharmacologic Prophylaxis   Medication   None                Code Status: Not on file  Guillen: No urinary catheter in place  Guillen Duration (in days):   Central line:    BREA:     Discharge is dependent on: progress  At this point Mr. Meza is expected to be discharge to: tbd    The 21st Century Cures Act makes medical notes like these available to patients in the interest of transparency. Please be advised this is a medical document. Medical documents are intended to carry relevant information, facts as evident, and the clinical opinion of the practitioner. The medical note is intended as peer to peer communication and may appear blunt or direct. It is written in medical language and may contain abbreviations or verbiage that are unfamiliar.             **Certification      PHYSICIAN Certification of Need for Inpatient Hospitalization - Initial Certification    Patient will require inpatient services that will reasonably be expected to span two midnight's based on the clinical documentation in H+P.   Based on patients current state of illness, I anticipate that, after discharge, patient will require TBD.

## 2024-05-26 NOTE — SLP NOTE
ADULT SWALLOWING EVALUATION    ASSESSMENT    ASSESSMENT/OVERALL IMPRESSION:  Pt seen for BSSE per stroke protocol. Pt with recent fall 2 weeks ago in [place] that resulted in a hemorrhagic CVA. Following treatment in [place] and [place] was reported to be agitated and trying to elope. Pt also has a diagnosis of Alz. Pt was brought to the hospital and found to have a intraparenchymal hemorrhage. Pt's family is at bedside along with a sitter as pt has been confused and experiencing delirium. Posey vest also in place.  Per the family, pt needs dentures to masticate solids. Family to bring in dentures.  Pt hard of hearing, Yi speaking.  Pt refusing po trials, however per family the pt refuses to eat frequently at home as well.   Pt given puree and thins by cup and straw. Pt with good acceptance, containment and timeliness with all po trials. Pt with no s/s of aspiration observed. Recommend puree with thin liquids, straws ok provided that pt is calm, sitting upright in bed with 1:1 support. Will attempt to upgrade solid consistency once dentures are in place. RN aware.           RECOMMENDATIONS   Diet Recommendations - Solids: Puree  Diet Recommendations - Liquids: Thin Liquids                        Compensatory Strategies Recommended: Slow rate;Alternate consistencies;Small bites and sips (straws ok, provided that the pt is upright, alert, and calm)  Aspiration Precautions: Upright position;Slow rate;Small bites and sips  Medication Administration Recommendations: Crushed in puree  Treatment Plan/Recommendations: Dysphagia therapy    HISTORY   MEDICAL HISTORY  Reason for Referral: Stroke protocol    Problem List  Principal Problem:    Intracranial hemorrhage (HCC)  Active Problems:    Gait disturbance    Alzheimer's dementia with agitation (HCC)      Past Medical History  Past Medical History:    Dementia (HCC)    Essential hypertension    Hyperlipidemia       Prior Living Situation: Home with  support  Diet Prior to Admission: Regular;Thin liquids           SWALLOWING HISTORY  Current Diet Consistency: NPO  Dysphagia History: Pt has not been seen by our ST service previously.  Imaging Results: CONCLUSION:  Right frontal lobe abnormality consistent with intraparenchymal hemorrhage with faint high density blood components.  Details as above.     SUBJECTIVE   Pt calm, confused, hard of hearing, responsive.    OBJECTIVE   ORAL MOTOR EXAMINATION  Dentition: Edentulous (has dentures at home that he does use for solids, family to bring in the dentures)  Symmetry: Within Functional Limits  Strength: Within Functional Limits  Tone: Within Functional Limits  Range of Motion: Within Functional Limits  Rate of Motion: Within Functional Limits    Voice Quality: Clear  Respiratory Status: Unlabored  Consistencies Trialed: Thin liquids;Puree  Method of Presentation: Staff/Clinician assistance;Cup;Single sips;Straw  Patient Positioning: Upright    Oral Phase of Swallow: Within Functional Limits                      Pharyngeal Phase of Swallow: Within Functional Limits           (Please note: Silent aspiration cannot be evaluated clinically. Videofluoroscopic Swallow Study is required to rule-out silent aspiration.)    Esophageal Phase of Swallow: No complaints consistent with possible esophageal involvement              GOALS  Goal #1 The patient will tolerate puree consistency and thin liquids without overt signs or symptoms of aspiration with 100 % accuracy over 1-2 session(s).    In Progress   Goal #2 The patient/family/caregiver will demonstrate understanding and implementation of aspiration precautions and swallow strategies independently over 1-2 session(s).  In Progress   Goal #3 SLP to upgrade solid consistency once dentures are available.    No addressed.     FOLLOW UP  Treatment Plan/Recommendations: Dysphagia therapy     Follow Up Needed (Documentation Required): Yes  SLP Follow-up Date: 05/27/24 (to attempt  solid upgrade)    Thank you for your referral.   If you have any questions, please contact Jessica Villarreal, SANTY

## 2024-05-26 NOTE — CONSULTS
[facility]  Psychiatric Evaluation    REKHA Meza YOB: 1940   Age/Gender 84 year old male MRN [id number]   [facility] 3NE-A PCP None Pcp     Date of Service:  5/26/2024     Allergies:  Patient has no known allergies.    Reason for consultation: Dementia with behavioral disturbance, possible delirium    Requesting provider: Hospitalist service    Psychiatric impression:  Axis I: Dementia with behavioral disturbance  Axis III: Right frontal and intracranial hemorrhage  Axis IV: Cannot care for self  Axis V: 10    Recommendations:  1.  Patient meets involuntary psychiatric admission criteria on the basis of inability to care for self.  Second certificate is filed.  Euro psychiatric hospitalization should be sought.  2.  Will use low-dose quetiapine at nighttime for dementia with behavioral disturbance.  Try to avoid sedating medications throughout the daytime.    Data base:    Chief Complaint: Patient is unable to give a coherent history       Reason for Admission/History of Present Illness:  REKHA Bey is a 84 year old male.  He is unable to provide any meaningful information.  His sons are at bedside.  Bhutanese was spoken.    He was visiting family in [place] about 2 weeks ago and had a fall and suffered an intracranial hemorrhage.  He was hospitalized and sent home without specific follow-up.  Since that time, he has been very confused, agitated, pacing the home, trying to elope.  Family brought him to the hospital for help.    Patient's son states that the patient has had a diagnosis of Alzheimer's for about 5 years but was normally quiet and tranquil.    There is no known psychiatric history.    Patient denies hallucinating.  He denies feeling paranoid.    He also denies being in any pain, specifically headache.    Family denies any drug or alcohol history for the patient.    Past Psychiatric/Medication History:  None    Past Medical History:   Past Medical History:     Dementia (HCC)    Essential hypertension    Hyperlipidemia       Past Surgical History:   History reviewed. No pertinent surgical history.    Family History:   History reviewed. No pertinent family history.    Developmental/Social History:  Living with one of his sons nearby    ROS:  Unobtainable/unreliable    Exam:  MSE  He is hard of hearing.  He is Bulgarian-speaking only.  He is able to state the month is May.  He states his birthday is [birth year].  He does not know the year of his birthday.  He is edentulous.  He has short responses.  He has increased gesticulations with his hands.  He appears emaciated.  Insight and judgment are extremely impaired.  He does not appear to be agitated or hallucinating.    Patient Strengths/Assets:  Patient's strengths include family support as evidenced by clinical observation.    Suicide Risk Assessments:  Overall level of suicide risk is low. This is evidenced by the following:     Risk Factors:  Medical    Environmental Factors:  Family support    Protective Factors:  No history of mental illness      Assessment/Diagnoses:  Primary Psychiatric Diagnoses  Dementia with behavioral disturbance  Rule out delirium       Secondary Psychiatric Diagnoses         Rule Out Diagnoses         Pervasive Diagnoses         Pertinent Non-Psychiatric Diagnoses         Problem List:  Worsening of baseline mental status and perhaps delirium after intracranial hemorrhage    Plan:  See recommendations above    Medications:  [unfilled]      Ziyad Villarreal MD  5/26/2024

## 2024-05-26 NOTE — CERTIFICATION
Ref: [facility] 5/3-403, 5/3-602, 5/3-607, 5/3-610    5/3-702, 5/3-813, 5/4-306, 5/4-402, 5/4-403    5/4-405, 5/4-501, 5/4-611, 5/4-705   Inpatient Certificate  Re: REKHA Dobsonen Derrick    (name)     I personally informed the above-named individual of the purpose of this examination and that he or she did not have to speak to me, and that any statements made might be related in court as to the individual's clinical condition or need for services.  Additionally, if this examination was for the purpose of determining that the above-named individual is developmentally disabled and dangerous, I informed the individual of his or her right to speak with a relative, friend or  before the examination, and of his or her right to have an  appointed for him or her if he or she so desired.     Electronically signed by Ziyad Villarreal MD    Signature of Examiner     On May 26 , 2024 , at 9:00  [x] a.m. [] p.m.,  I personally examined the    (date)  (year)  (time)    above-named individual. The examination was conducted in person at [facility].  Or   [x] Via Interactive Communication System (telepsychiatry)      Based on the foregoing examination, it is my opinion that he or she is:  []  A person with mental illness who, because of his or her illness is reasonably expected, unless treated on an inpatient basis, to engage in conduct placing such person or another in physical harm or in reasonable expectation of being physically harmed;   [x]  A person with mental illness who, because of his or her illness is unable to provide for his or her basic physical needs so as to guard himself or herself from serious harm, without the assistance of family or others, unless treated on an inpatient basis;   []  A person with mental illness who: refuses treatment or is not adhering adequately to prescribed treatment; because of the nature of his or her illness is unable to understand his or her need for treatment; and if not  treated on an inpatient basis, is reasonably expected based on his or her behavioral history, to suffer mental or emotional deterioration and is reasonably expected, after such deterioration, to meet the criteria of either paragraph one or paragraph two above;   []  An individual who is developmentally disabled and unless treated on an in-patient basis is reasonably expected to inflict serious physical harm upon himself or herself or others in the near future, and/or   [x]  Is in need of immediate hospitalization for the prevention of such harm.   I base my opinion on the following (including clinical observations, factual information):  Patient presents with dementia and agitation and cannot care for himself.  I believe that the individual is subject to: []  Involuntary inpatient admission and is not in need of immediate hospitalization   (check one) [x]  Involuntary inpatient admission and is in need of immediate hospitalization    []  Judicial inpatient admission and is not in need of immediate hospitalization    []  Judicial inpatient admission and is in need of immediate hospitalization     Date: 5/26/2024 Signature: Electronically signed by Ziyad Villarreal MD   Title: MD Printed Name: Ziyad Villarreal MD     -2006 (R-12-22) Inpatient Certificate  Printed by Authority of the [facility] -0- Copies Page 1 of 1

## 2024-05-26 NOTE — BH PROGRESS NOTE
Petition completed and activated. Certificate completed and rights form completed. Patient provided copies    Activated petition, certificate and rights form all scanned into patient's chart and originals based on patient's paper chart.  Activated petition, certificate and rights form along with facesheet and cover sheet were securely emailed to GLORIA

## 2024-05-27 PROBLEM — G93.40 ACUTE ENCEPHALOPATHY: Status: ACTIVE | Noted: 2024-05-27

## 2024-05-27 PROBLEM — F03.90 DEMENTIA WITHOUT BEHAVIORAL DISTURBANCE (HCC): Status: ACTIVE | Noted: 2024-05-26

## 2024-05-27 LAB
GLUCOSE BLD-MCNC: 111 MG/DL (ref 70–99)
GLUCOSE BLD-MCNC: 115 MG/DL (ref 70–99)
GLUCOSE BLD-MCNC: 127 MG/DL (ref 70–99)
GLUCOSE BLD-MCNC: 89 MG/DL (ref 70–99)

## 2024-05-27 PROCEDURE — 95720 EEG PHY/QHP EA INCR W/VEEG: CPT | Performed by: OTHER

## 2024-05-27 PROCEDURE — 99233 SBSQ HOSP IP/OBS HIGH 50: CPT | Performed by: HOSPITALIST

## 2024-05-27 PROCEDURE — 99232 SBSQ HOSP IP/OBS MODERATE 35: CPT | Performed by: OTHER

## 2024-05-27 PROCEDURE — 99233 SBSQ HOSP IP/OBS HIGH 50: CPT | Performed by: OTHER
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
## 2024-05-27 NOTE — PHYSICAL THERAPY NOTE
PHYSICAL THERAPY EVALUATION - INPATIENT     Room Number: 3627/3627-A  Evaluation Date: 5/27/2024  Type of Evaluation: Initial  Physician Order: PT Eval and Treat    Presenting Problem: Intracrnail Hemorrhage  Co-Morbidities : HTN, DL, dementia  Reason for Therapy: Mobility Dysfunction and Discharge Planning    PHYSICAL THERAPY ASSESSMENT   Patient is currently functioning below baseline with bed mobility, transfers, and gait.  Prior to admission, patient's baseline is IND.  Patient is requiring contact guard assist and minimal assist as a result of the following impairments: decreased endurance/aerobic capacity, impaired Gait and standing balance, impaired coordination, and decreased muscular endurance.  Physical Therapy will continue to follow for duration of hospitalization.    Patient will benefit from continued skilled PT Services to facilitate return to prior level of function as patient demonstrates high motivation with excellent tolerance to an intensive therapy program .    PLAN  PT Treatment Plan: Bed mobility;Body mechanics;Gait training;Strengthening;Stair training;Transfer training;Balance training  Rehab Potential : Good  Frequency (Obs): 3-5x/week  Number of Visits to Meet Established Goals: 3      CURRENT GOALS    Goal #1 Patient is able to demonstrate supine - sit EOB @ level: supervision     Goal #2 Patient is able to demonstrate transfers EOB to/from C at assistance level: supervision     Goal #3 Patient is able to ambulate 150 feet with assist device: walker - rolling at assistance level: modified independent     Goal #4    Goal #5    Goal #6    Goal Comments: Goals established on 5/27/2024      PHYSICAL THERAPY MEDICAL/SOCIAL HISTORY  History related to current admission: Patient is a 84 year old male admitted on 5/25/2024 from Home for Intracranial Hemorrhage.  Per family and EMR. Pt was in [place] where he had a hemorrhagic stroke involving the R frontal lobe roughly 2 weeks ago. Pt flew  [place] the [place] were pt was then brought to the ER via family.       HOME SITUATION  Type of Home: House   Home Layout: Two level        Stairs to Bedroom:  (Flight)  Railing: Yes    Lives With: Family  Drives: No  Patient Owned Equipment: Rolling walker;Cane       Prior Level of Ingram: Pt's son assisted in providing PLOF. Pt reports that pt lives in a house with his son and son's family. Pt was IND with all ADLs and mobility. Pt did not use a assisted device. Pt was able to be left alone at time.    SUBJECTIVE  \"I want to walk\"  Pt's son assisted in translation.       OBJECTIVE  Precautions:  needed  Fall Risk: High fall risk    WEIGHT BEARING RESTRICTION  Weight Bearing Restriction: None                PAIN ASSESSMENT  Ratin  Location: Pt reports no pain       COGNITION  Overall Cognitive Status:  Impaired  Following Commands:  follows one step commands with increased time and follows one step commands with repetition  Safety Judgement:  decreased awareness of need for assistance and decreased awareness of need for safety  Awareness of Errors:  assistance required to identify errors made, assistance required to correct errors made, and decreased awareness of errors   Awareness of Deficits:  decreased awareness of deficits    RANGE OF MOTION AND STRENGTH ASSESSMENT  Upper extremity ROM and strength are within functional limits     Lower extremity ROM is within functional limits     Lower extremity strength is within functional limits       BALANCE  Static Sitting: Fair  Dynamic Sitting: Fair  Static Standing: Fair -  Dynamic Standing: Fair -    ADDITIONAL TESTS  Additional Tests: Modified Kiana              Modified Kiana: 4                  ACTIVITY TOLERANCE                         O2 WALK       NEUROLOGICAL FINDINGS                        AM-PAC '6-Clicks' INPATIENT SHORT FORM - BASIC MOBILITY  How much difficulty does the patient currently have...  Patient Difficulty: Turning over in bed  (including adjusting bedclothes, sheets and blankets)?: A Little   Patient Difficulty: Sitting down on and standing up from a chair with arms (e.g., wheelchair, bedside commode, etc.): A Little   Patient Difficulty: Moving from lying on back to sitting on the side of the bed?: A Lot   How much help from another person does the patient currently need...   Help from Another: Moving to and from a bed to a chair (including a wheelchair)?: A Little   Help from Another: Need to walk in hospital room?: A Lot   Help from Another: Climbing 3-5 steps with a railing?: A Lot       AM-PAC Score:  Raw Score: 15   Approx Degree of Impairment: 57.7%   Standardized Score (AM-PAC Scale): 39.45   CMS Modifier (G-Code): CK    FUNCTIONAL ABILITY STATUS  Gait Assessment   Functional Mobility/Gait Assessment  Gait Assistance: Minimum assistance  Distance (ft): 70  Assistive Device: Rolling walker  Pattern: Shuffle (Drift towards the R)    Skilled Therapy Provided     Bed Mobility:  Rolling: Min A  Supine to sit: Mod A   Sit to supine: Mod A     Transfer Mobility:  Sit to stand: Min A   Stand to sit: Min A  Gait = Min a 70ft using RW    Therapist's Comments: Pt was observed to require constant re-positioning of pt and the RW when ambulating. Pt was observed to drift towards the R and had difficulty to maintain in a straight line. Pt was not able to recognize his position in space compared to the RW. At this time pt is a good candidate for intense rehab as pt has strong family support, highly motivated and good prognosis.     Exercise/Education Provided:  Bed mobility  Body mechanics  Gait training  Transfer training    Patient End of Session: In bed;Needs met;Call light within reach;All patient questions and concerns addressed;Family present;Alarm set      Patient Evaluation Complexity Level:  History Moderate - 1 or 2 personal factors and/or co-morbidities   Examination of body systems Low - addressing 1-2 elements   Clinical Presentation  Low - Stable   Clinical Decision Making Low - Stable       PT Session Time: 23 minutes  Therapeutic Activity: 15 minutes

## 2024-05-27 NOTE — PLAN OF CARE
End of shift note:  Pt is A&Ox1, confused. Neuro checks q shift. On continuous EEG at this time. No reported pain overnight. Family at bedside-helps w/ translation-pt is Turkish speaking only. On tele, SR, room air-no sob. On pureed diet w/ thin liquids. Takes meds crushed in applesauce. Continent of bowel and bladder-asks for the urinal. Plan for mri in am.      Problem: Safety Risk - Non-Violent Restraints  Goal: Patient will remain free from self-harm  Description: INTERVENTIONS:  - Apply the least restrictive restraint to prevent harm  - Notify patient and family of reasons restraints applied  - Assess for any contributing factors to confusion (electrolyte disturbances, delirium, medications)  - Discontinue any unnecessary medical devices as soon as possible  - Assess the patient's physical comfort, circulation, skin condition, hydration, nutrition and elimination needs   - Reorient and redirection as needed  - Assess for the need to continue restraints  Outcome: Progressing

## 2024-05-27 NOTE — PHYSICAL THERAPY NOTE
Reviewed pt's chart and discuss pt with RN. At this time Pt is still on EEG. PT Will re-attempt when appropriate.

## 2024-05-27 NOTE — PROCEDURES
LONG-TERM VIDEO EEG REPORT;    Reason for Examination: Encephalopathy, staring spells.    Technical Summary:   18 Channels of EEG and 1 Channel of EKG was performed utilizing internation 10/20 method. Motion, muscle and machine artifacts were noted.       Recording Start date/time: 05/26 at 1415  Recording end date/time: 05/27 at 1015      Background Activity:   The background activity consisted of 7-8 Hz waveforms, reactive to eye opening/ external stimulation.    Abnormality:  Throughout the recording low to medium voltage, polymorphic, 3 to 6 Hz slow activity was noted diffusely over both hemispheres      Activation:    Hyperventilation:   Not Performed.    Photic Stimulation:  Driving response seen.No       Sleep:  Stage I sleep seen.       Impression:  This is a Abnormal prolonged Video EEG study.   Moderate diffuse slowing into delta and theta range was noted.  This constellation of findings can be seen in encephalopathy due to metabolic/toxic etiology, medication effects or diffuse cerebral injury.  No focal, lateralized or generalized epileptiform activity seen. Clinical correlation is recommended.        Buzz Stahl MD  [facility].

## 2024-05-27 NOTE — PROGRESS NOTES
Change of Status sent to [facility], patient does not require inpatient psychiatric placement anymore.

## 2024-05-27 NOTE — PROGRESS NOTES
Assumed care at 7:30 am  Patient alert and oriented x 2-3  Family at bedside.   EEG discontinued by neuro  Per ying Andres to discontinue sitter  All safety precautions in place.   Will continue to monitor patient

## 2024-05-27 NOTE — CONSULTS
[facility]   NEUROLOGY   CONSULT NOTE    Admission date: 5/25/2024  Reason for Consult: Intracerebral bleed.  Chief Complaint:   Chief Complaint   Patient presents with    Headache    Neurologic Problem   ________________________________________________________________    History     History of Presenting Illness  84 year old male with hypertension, hyperlipidemia and dementia was flown from [place] and brought directly from the airport.  Patient apparently had a right hemorrhagic stroke in [place] 2 weeks ago.  Patient was subsequently hospitalized and discharged and was admitted for ongoing care.  Family member states that patient is more confused and is having staring spells.  Also noted to have difficulty with ambulation.  Patient is  speaking and conversation is assisted by patient's family member.  Patient had a CT scan done in the emergency which reported right frontal bleed measuring up to 2 cm with mild perilesional edema, without midline shift.    History obtained from patient's family members and chart review.    Past Medical History:    Dementia (HCC)    Essential hypertension    Hyperlipidemia     History reviewed. No pertinent surgical history.  Social History     Socioeconomic History    Marital status:    Tobacco Use    Smoking status: Never    Smokeless tobacco: Never   Vaping Use    Vaping status: Never Used   Substance and Sexual Activity    Alcohol use: Never    Drug use: Never     Social Determinants of Health     Food Insecurity: No Food Insecurity (5/25/2024)    Food Insecurity     Food Insecurity: Never true   Transportation Needs: No Transportation Needs (5/25/2024)    Transportation Needs     Lack of Transportation: No    Received from [facility]    Social Connections   Housing Stability: Low Risk  (5/25/2024)    Housing Stability     Housing Instability: No     History reviewed. No pertinent family history.  Allergies No Known  Allergies    Home Meds  Current Outpatient Medications   Medication Instructions    amLODIPine (NORVASC) 5 mg, Oral, Daily    aspirin 81 mg, Oral, Daily    atorvastatin (LIPITOR) 20 mg, Oral, Nightly    glipiZIDE (GLUCOTROL) 5 mg, Oral, Daily, Takes 2.5mg PO daily with breakfast    levETIRAcetam (KEPPRA) 500 mg, Oral, 2 times daily    losartan (COZAAR) 100 mg, Oral, Daily    memantine (NAMENDA) 10 mg, Oral, 2 times daily    metoprolol succinate ER (TOPROL XL) 25 mg, Oral, Daily     Scheduled Meds:   QUEtiapine  25 mg Oral Nightly    melatonin  1 mg Oral Nightly    metoprolol succinate ER  25 mg Oral Daily Beta Blocker    memantine  10 mg Oral BID    losartan  50 mg Oral Daily    levETIRAcetam  250 mg Oral BID    atorvastatin  20 mg Oral Nightly    amLODIPine  2.5 mg Oral BID    insulin aspart  1-10 Units Subcutaneous TID AC and HS     Continuous Infusions:   sodium chloride 75 mL/hr at 05/26/24 0910     PRN Meds:  OLANZapine **OR** OLANZapine (Zyprexa) 5 mg in sterile water for injection (PF) IM injection    glucose **OR** glucose **OR** glucose-vitamin C **OR** dextrose **OR** glucose **OR** glucose **OR** glucose-vitamin C    acetaminophen    melatonin    glycerin-hypromellose-    sodium chloride    ondansetron    QUEtiapine    OBJECTIVE   VITAL SIGNS:   Temp:  [96.9 °F (36.1 °C)-97.9 °F (36.6 °C)] 97.5 °F (36.4 °C)  Pulse:  [62-99] 82  Resp:  [16] 16  BP: (120-160)/(62-82) 160/62  SpO2:  [92 %-98 %] 94 %    PHYSICAL EXAM:    NEUROLOGIC:    Mental Status: Awake, intermittently staring at the ceiling, not interacting much, not answering questions, following commands per family's instructions.  Apparently no significant speech difficulty.   Cranial nerves: PERRL.  Visual fields full.  EOMI.  Face symmetric with normal movement bilaterally.  Hearing grossly intact. Tongue midline with normal movements.   Motor: Mild left pronator drift noted; Motor exam is symmetrical in all extremities bilaterally  Sensation:  Intact to light touch bilaterally  Cerebellar: Normal Finger-To-Nose test      LABORATORY DATA:  Last 24 hour labs were reviewed in detail.  Recent Labs   Lab 05/25/24  1203 05/26/24  0908   * 139   K 5.5* 5.1    108   CO2 25.0 28.0   * 115*   BUN 27* 20   CREATSERUM 1.87* 1.65*     Recent Labs   Lab 05/25/24  1203 05/26/24  0908   WBC 8.3 6.9   HGB 12.5* 12.8*   .0 252.0     Recent Labs   Lab 05/25/24  1203   ALT 24   AST 26     No results for input(s): \"MG\", \"PHOS\" in the last 168 hours.  Last A1c value was 6.4% done 5/25/2024.           Radiology:    Right frontal lobe abnormality consistent with intraparenchymal hemorrhage with faint high density blood components.  Details as above.   ASSESSMENT/PLAN   84 year old male with PMH of hypertension and dementia here with:    Subacute intracerebral bleed.  CT evidence of resolution with mild perilesional edema noted.  Advise MRI brain with MRA of head and neck.  Normotensive blood pressure, neurochecks, OT/PT/rehab eval.  Suspicion of ongoing complex partial seizures.  Patient already on Keppra to 50 mg twice daily.  Will check continuous EEG for further evaluation.  History of underlying dementia with behavioral disorder.  Patient currently on memantine and quetiapine.  Advised to continue medication.  Family members counseled.    Principal Problem:    Intracranial hemorrhage (HCC)  Active Problems:    Gait disturbance    Alzheimer's dementia with agitation (HCC)       Buzz Stahl MD  Neurohospitalist  [facility]    Disclaimer: This record was dictated using Dragon software. There may be errors due to voice recognition problems that were not realized and corrected during the completion of the note.       Xray Wrist 3 Views, Right

## 2024-05-27 NOTE — PROGRESS NOTES
[facility]   part of [facility]     Hospitalist Progress Note     REKHA Meza Patient Status:  Inpatient    5/15/1940 MRN [id number]   Location [facility] 3NE-A Attending Sheri Solis MD   Hosp Day # 2 PCP None Pcp     Chief Complaint: Agitation    Subjective:     Patient received Melatonin yesterday, less agitated.    Objective:    Review of Systems:   Unable to obtain    Vital signs:  Temp:  [96.9 °F (36.1 °C)-97.9 °F (36.6 °C)] 97.1 °F (36.2 °C)  Pulse:  [65-83] 65  Resp:  [16] 16  BP: (112-160)/(60-73) 139/67  SpO2:  [92 %-96 %] 96 %    Physical Exam:    General: No acute distress, restrained, confused  Respiratory: No wheezes, no rhonchi  Cardiovascular: S1, S2, regular rate and rhythm  Abdomen: Soft, Non-tender, non-distended, positive bowel sounds  Neuro: No new focal deficits.   Extremities: No edema      Diagnostic Data:    Labs:  Recent Labs   Lab 24  1203 24  0908   WBC 8.3 6.9   HGB 12.5* 12.8*   MCV 94.8 95.0   .0 252.0       Recent Labs   Lab 24  1203 24  0908   * 115*   BUN 27* 20   CREATSERUM 1.87* 1.65*   CA 9.9 9.8   ALB 4.1  --    * 139   K 5.5* 5.1    108   CO2 25.0 28.0   ALKPHO 59  --    AST 26  --    ALT 24  --    BILT 0.6  --    TP 8.0  --        Estimated Creatinine Clearance: 29.4 mL/min (A) (based on SCr of 1.65 mg/dL (H)).    No results for input(s): \"TROP\", \"TROPHS\", \"CK\" in the last 168 hours.    No results for input(s): \"PTP\", \"INR\" in the last 168 hours.               Microbiology    No results found for this visit on 24.      Imaging: Reviewed in Epic.    Medications:    QUEtiapine  25 mg Oral Nightly    melatonin  1 mg Oral Nightly    metoprolol succinate ER  25 mg Oral Daily Beta Blocker    memantine  10 mg Oral BID    losartan  50 mg Oral Daily    levETIRAcetam  250 mg Oral BID    atorvastatin  20 mg Oral Nightly    amLODIPine  2.5 mg Oral BID    insulin aspart  1-10 Units Subcutaneous TID AC and HS        Assessment & Plan:      #Recent fall with right frontal ICH, subacute  PT/OT/ST when able  Avoid blood thinners, antiplatelets  Cont empiric Keppra  MRI/MRA pending  EEG pending  Neuro and psych following     #Acute encephalopathy on chronic dementia, likely delirium due to above  Zyprexa PRN  May need to be restrained  Psychiatry following, does not need inpt psych anymore     #DMII, hyperglycemia protocol, A1c 6.4    #HTN, controlled     #DL, statin    #REBEKAH on CKD, gentle IVF, improved    #Dispo, home likely tomorrow         Sheri Solis MD    Supplementary Documentation:     Quality:  DVT Mechanical Prophylaxis:   SCDs,    DVT Pharmacologic Prophylaxis   Medication   None                Code Status: Not on file  Guillen: No urinary catheter in place  Guillen Duration (in days):   Central line:    BREA:     Discharge is dependent on: progress  At this point Mr. Meza is expected to be discharge to: tbd    The 21st Century Cures Act makes medical notes like these available to patients in the interest of transparency. Please be advised this is a medical document. Medical documents are intended to carry relevant information, facts as evident, and the clinical opinion of the practitioner. The medical note is intended as peer to peer communication and may appear blunt or direct. It is written in medical language and may contain abbreviations or verbiage that are unfamiliar.             **Certification      PHYSICIAN Certification of Need for Inpatient Hospitalization - Initial Certification    Patient will require inpatient services that will reasonably be expected to span two midnight's based on the clinical documentation in H+P.   Based on patients current state of illness, I anticipate that, after discharge, patient will require TBD.

## 2024-05-27 NOTE — PROGRESS NOTES
History of Presenting Illness:  Patient seen for follow-up visit.  Patient still confused, disoriented, increasingly drowsy.  Patient had some agitational spells yesterday for which psychiatry is seeing the patient.  No new weakness, no seizures.    Vitals:   Vitals:    05/27/24 0600   BP: 139/67   Pulse: 65   Resp:    Temp:           Examination:    Drowsy but arousable, follows family members cues, mostly nonverbal.  Pupils round reactive light, extraocular movements intact, no gross facial asymmetry, hearing grossly intact.  Patient moving all 4 extremities.      Investigations:    CT BRAIN OR HEAD (86745)    Result Date: 5/25/2024  CONCLUSION:  Right frontal lobe abnormality consistent with intraparenchymal hemorrhage with faint high density blood components.  Details as above.  Critical value test result called to Dr. Stone in the ED with accurate read back.  1445 hours.    LOCATION:  [facility]   Dictated by (CST): Bobby Rodriguez MD on 5/25/2024 at 2:49 PM     Finalized by (CST): Bobby Rodriguez MD on 5/25/2024 at 2:56 PM       XR CHEST AP PORTABLE  (CPT=71045)    Result Date: 5/25/2024  CONCLUSION:  New opacities at the left lung base.  Correlation for pneumonitis/pneumonia recommended.  Follow-up to resolution recommended.   LOCATION:  [facility]      Dictated by (CST): Bobby Rodriguez MD on 5/25/2024 at 12:49 PM     Finalized by (CST): Bobby Rodriguez MD on 5/25/2024 at 12:50 PM            Lab Results   Component Value Date    A1C 6.4 (H) 05/25/2024        No results found for: \"LDL\", \"HDL\", \"TRIG\"     Recent Labs   Lab 05/25/24  1203 05/26/24  0908   RBC 4.00 4.22   HGB 12.5* 12.8*   HCT 37.9* 40.1   MCV 94.8 95.0   MCH 31.3 30.3   MCHC 33.0 31.9   RDW 12.7 12.6   NEPRELIM 6.59 4.70   WBC 8.3 6.9   .0 252.0       Recent Labs   Lab 05/25/24  1203 05/26/24  0908   * 115*   BUN 27* 20   CREATSERUM 1.87* 1.65*   EGFRCR 35* 40*   CA 9.9 9.8   * 139   K 5.5* 5.1    108   CO2 25.0 28.0          Impression/MDM.    Subacute intracerebral bleed.  CT evidence of resolution with mild perilesional edema noted.  Advise MRI brain with MRA of head and neck.  Normotensive blood pressure, neurochecks, OT/PT/rehab eval.  Suspicion of ongoing complex partial seizures.  Patient already on Keppra to 500 mg twice daily.   Continuous EEG did not show any evidence of electrographic seizures.    History of underlying dementia with behavioral disorder.  Patient currently on memantine and quetiapine.  Advised to continue medication.  Increasing agitation, probably due to underlying dementia.  Psychiatry consulted by primary team.  Appreciate recommendations.  Family members counseled.

## 2024-05-28 ENCOUNTER — APPOINTMENT (OUTPATIENT)
Dept: MRI IMAGING | Facility: HOSPITAL | Age: 84
End: 2024-05-28
Attending: Other
Payer: MEDICARE

## 2024-05-28 VITALS
RESPIRATION RATE: 16 BRPM | SYSTOLIC BLOOD PRESSURE: 146 MMHG | BODY MASS INDEX: 21.97 KG/M2 | HEIGHT: 67 IN | OXYGEN SATURATION: 98 % | WEIGHT: 140 LBS | DIASTOLIC BLOOD PRESSURE: 76 MMHG | TEMPERATURE: 98 F | HEART RATE: 66 BPM

## 2024-05-28 LAB
GLUCOSE BLD-MCNC: 105 MG/DL (ref 70–99)
GLUCOSE BLD-MCNC: 122 MG/DL (ref 70–99)

## 2024-05-28 PROCEDURE — 70546 MR ANGIOGRAPH HEAD W/O&W/DYE: CPT | Performed by: OTHER

## 2024-05-28 PROCEDURE — 70553 MRI BRAIN STEM W/O & W/DYE: CPT | Performed by: OTHER

## 2024-05-28 PROCEDURE — 99239 HOSP IP/OBS DSCHRG MGMT >30: CPT | Performed by: HOSPITALIST

## 2024-05-28 PROCEDURE — 70549 MR ANGIOGRAPH NECK W/O&W/DYE: CPT | Performed by: OTHER

## 2024-05-28 PROCEDURE — 99232 SBSQ HOSP IP/OBS MODERATE 35: CPT | Performed by: OTHER

## 2024-05-28 RX ORDER — GADOTERATE MEGLUMINE 376.9 MG/ML
15 INJECTION INTRAVENOUS
Status: COMPLETED | OUTPATIENT
Start: 2024-05-28 | End: 2024-05-28

## 2024-05-28 RX ORDER — MAGNESIUM OXIDE 400 MG (241.3 MG MAGNESIUM) TABLET
1 TABLET NIGHTLY
Qty: 30 TABLET | Refills: 1 | Status: SHIPPED | OUTPATIENT
Start: 2024-05-28

## 2024-05-28 RX ORDER — LOSARTAN POTASSIUM 50 MG/1
50 TABLET ORAL DAILY
Qty: 30 TABLET | Refills: 1 | Status: SHIPPED | OUTPATIENT
Start: 2024-05-29

## 2024-05-28 NOTE — DISCHARGE INSTRUCTIONS
Sometimes managing your health at home requires assistance.  The [facility] team has recognized your preference to use [facility].  They can be reached by phone at [phone].  The fax number for your reference is [phone].  A representative from the home health agency will contact you or your family to schedule your first visit.

## 2024-05-28 NOTE — OCCUPATIONAL THERAPY NOTE
OCCUPATIONAL THERAPY EVALUATION - INPATIENT     Room Number: 3627/3627-A  Evaluation Date: 5/28/2024  Type of Evaluation: Initial  Presenting Problem: ICH, acute encephalopathy    Physician Order: IP Consult to Occupational Therapy  Reason for Therapy: ADL/IADL Dysfunction and Discharge Planning    OCCUPATIONAL THERAPY ASSESSMENT   Patient is currently functioning below baseline with toileting, bathing, upper body dressing, lower body dressing, grooming, eating, bed mobility, and transfers. Prior to admission, patient's baseline is independent.  Patient is requiring moderate assist as a result of the following impairments: decreased functional strength, decreased functional reach, impaired standing balance, impaired coordination, cognitive deficits (attention, memory), decreased insight to deficits, decreased safety awareness, and decreased visual spatial awareness. Occupational Therapy will continue to follow for duration of hospitalization.    Patient will benefit from continued skilled OT Services to facilitate return to prior level of function as patient demonstrates high motivation with excellent tolerance to an intensive therapy program       History Related to Current Admission: Patient is a 84 year old male admitted on 5/25/2024 with Presenting Problem: ICH, acute encephalopathy. Co-Morbidities : HTN, DL, dementia    WEIGHT BEARING RESTRICTION  Weight Bearing Restriction: None                Recommendations for nursing staff:   Transfers: one person and RW  Toileting location: toilet    EVALUATION SESSION:  Patient Start of Session: supine  FUNCTIONAL TRANSFER ASSESSMENT  Sit to Stand: Chair; Edge of Bed  Edge of Bed: Minimal Assist  Chair: Minimal Assist  Toilet Transfer: Minimal Assist    BED MOBILITY  Rolling: Contact Guard Assist  Supine to Sit : Contact Guard Assist  Sit to Supine (OT): Contact Guard Assist  Scooting: CGA    BALANCE ASSESSMENT  Static Sitting: Supervision  Static Standing: Contact Guard  Assist    FUNCTIONAL ADL ASSESSMENT  Eating: Modified Independent  LB Dressing Seated: Moderate Assist  Toileting Seated: Minimal Assist      ACTIVITY TOLERANCE: WFL  EOB: 146/76             O2 SATURATIONS   94% on room air    COGNITION  Arousal/Alertness:  appropriate responses to stimuli  Attention Span:  attends with cues to redirect  Orientation Level:  oriented to place, oriented to person, disoriented to time, and disoriented to situation  Memory:  impaired working memory, decreased recall of precautions, and decreased recall of recent events  Following Commands:  follows one step commands with increased time and follows one step commands with repetition  Initiation: appears intact  Motor Planning: intact  Perseveration: not present  Safety Judgement:  decreased awareness of need for assistance and decreased awareness of need for safety  Awareness of Errors:  assistance required to identify errors made    Upper Extremity   ROM: within functional limits   Strength: within functional limits except for the following;  Left Shoulder flexion  3+/5  Coordination  Gross motor: impaired  Fine motor: impaired LUE  Sensation: Light touch:  intact  Proprioception:  intact    EDUCATION PROVIDED  Patient: Plan of Care; Role of Occupational Therapy; Discharge Recommendations; Adaptive Equipment Recommendations; Functional Transfer Techniques; Fall Prevention; Posture/Positioning; Compensatory ADL Techniques  Patient's Response to Education: Verbalized Understanding; Returned Demonstration  Family/Caregiver: Plan of Care; Role of Occupational Therapy  Family/Caregiver's Response to Education: Verbalized Understanding    Equipment used: RW  Would benefit from additional trial      Therapist comments:   Patient requires auditory cueing to turn head and visually track to the left.  Patient also needs cueing to use left UE during self-care tasks.  Patient's family educated on sitting to the left of patient and presenting objects  on the left to increase attention to the left.  All family members present verbalized understanding.      Patient End of Session: Up in chair;With  staff;Needs met;Call light within reach;RN aware of session/findings;All patient questions and concerns addressed;Alarm set;Family present;Discussed recommendations with /    OCCUPATIONAL PROFILE    HOME SITUATION  Type of Home: House  Home Layout: Two level  Lives With: Family    Toilet and Equipment: Standard height toilet;3-in-1 commode  Shower/Tub and Equipment: Walk-in shower  Other Equipment: None    Occupation/Status: Farmer  Hand Dominance: Right  Drives: No  Patient Regularly Uses: None    Prior Level of Function: prior to fall 2 weeks ago, patient had been independent with self-care and functional mobility without the use of any adaptive device.  Patient was in [employer], working daily in his [employer].  Patient w/ no other falls in the past 6 months.  Patient has 13 living children.      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    PAIN ASSESSMENT  Ratin  Location: denies       OBJECTIVE  Precautions:  needed  Fall Risk: High fall risk      ASSESSMENTS    AM-PAC ‘6-Clicks’ Inpatient Daily Activity Short Form  -   Putting on and taking off regular lower body clothing?: A Lot  -   Bathing (including washing, rinsing, drying)?: A Lot  -   Toileting, which includes using toilet, bedpan or urinal? : A Little  -   Putting on and taking off regular upper body clothing?: A Little  -   Taking care of personal grooming such as brushing teeth?: A Little  -   Eating meals?: None    AM-PAC Score:  Score: 17  Approx Degree of Impairment: 50.11%  Standardized Score (AM-PAC Scale): 37.26    ADDITIONAL TESTS     NEUROLOGICAL FINDINGS      COGNITION ASSESSMENTS       PLAN  OT Treatment Plan: Balance activities;ADL training;Energy conservation/work simplification techniques;Visual perceptual  training;Functional transfer training;Endurance training;UE strengthening/ROM;Patient/Family education;Patient/Family training;Equipment eval/education;Compensatory technique education  Rehab Potential : Good  Frequency: 3-5x/week  Number of Visits to Meet Established Goals: 7    ADL GOALS:  Patient will perform lower body dressing w/ supervision and with adaptive equipment PRN  Patient will perform toileting with supervision and with adaptive equipment PRN.    Functional Transfer Goals:  Patient will transfer from sit to supine:  with supervision  Patient will transfer from supine to sit:  with supervision  Patient will transfer to toilet:  with supervision      Patient Evaluation Complexity Level:   Occupational Profile/Medical History MODERATE - Expanded review of history including review of medical or therapy record   Specific performance deficits impacting engagement in ADL/IADL MODERATE  3 - 5 performance deficits   Client Assessment/Performance Deficits MODERATE - Comorbidities and min to mod modifications of tasks    Clinical Decision Making MODERATE - Analysis of occupational profile, detailed assessments, several treatment options    Overall Complexity MODERATE     OT Session Time: 30 minutes  Self-Care Home Management: 15 minutes

## 2024-05-28 NOTE — PHYSICAL THERAPY NOTE
PHYSICAL THERAPY TREATMENT NOTE - INPATIENT    Room Number: 3627/3627-A     Session: 1     Number of Visits to Meet Established Goals: 3    Presenting Problem: Intracrnail Hemorrhage  Co-Morbidities : HTN, DL, dementia    ASSESSMENT   Patient demonstrates fair progress this session, goals  remain in progress.    Patient continues to function below baseline with bed mobility, transfers, gait, and stair negotiation.  Contributing factors to remaining limitations include decreased functional strength, decreased endurance/aerobic capacity, impaired standing  balance, impaired coordination, impaired motor planning, and L neglect  .  Next session anticipate patient to progress bed mobility, transfers, and gait.  Physical Therapy will continue to follow patient for duration of hospitalization.    Patient continues to benefit from continued skilled PT services: to facilitate return to prior level of function as patient demonstrates high motivation with excellent tolerance to an intensive therapy program .    PLAN  PT Treatment Plan: Bed mobility;Body mechanics;Gait training;Strengthening;Stair training;Transfer training;Balance training  Rehab Potential : Good  Frequency (Obs): 3-5x/week    CURRENT GOALS       Goal #1 Patient is able to demonstrate supine - sit EOB @ level: supervision      Goal #2 Patient is able to demonstrate transfers EOB to/from [facility] at assistance level: supervision      Goal #3 Patient is able to ambulate 150 feet with assist device: walker - rolling at assistance level: modified independent      Goal #4     Goal #5     Goal #6     Goal Comments: Goals established on 2024 all goals ongoing     SUBJECTIVE   pt denies pain     OBJECTIVE  Precautions:  needed    WEIGHT BEARING RESTRICTION  Weight Bearing Restriction: None                PAIN ASSESSMENT   Ratin  Location: pt denies       BALANCE                                                                                                                        Static Sitting: Fair  Dynamic Sitting: Fair           Static Standing: Poor +  Dynamic Standing: Poor    ACTIVITY TOLERANCE                         O2 WALK         AM-PAC '6-Clicks' INPATIENT SHORT FORM - BASIC MOBILITY  How much difficulty does the patient currently have...  Patient Difficulty: Turning over in bed (including adjusting bedclothes, sheets and blankets)?: A Little   Patient Difficulty: Sitting down on and standing up from a chair with arms (e.g., wheelchair, bedside commode, etc.): A Little   Patient Difficulty: Moving from lying on back to sitting on the side of the bed?: A Little   How much help from another person does the patient currently need...   Help from Another: Moving to and from a bed to a chair (including a wheelchair)?: A Little   Help from Another: Need to walk in hospital room?: A Lot   Help from Another: Climbing 3-5 steps with a railing?: Total       AM-PAC Score:  Raw Score: 15   Approx Degree of Impairment: 57.7%   Standardized Score (AM-PAC Scale): 39.45   CMS Modifier (G-Code): CK    FUNCTIONAL ABILITY STATUS  Gait Assessment   Functional Mobility/Gait Assessment  Gait Assistance: Minimum assistance;Moderate assistance  Distance (ft): 70,10  Assistive Device: Rolling walker  Pattern: Shuffle (L neglect, somewhat festinating)    Skilled Therapy Provided  Pt presents seated EOB, family present   Pt gait trained c RW min to mod A c cues for proper integration of RW  CGA at toilet to stand and urinate, pt requires increased assist for turns and is noted to fatigue and demos difficulty advancing LLE  Pt with noted L neglect, pt's chair placed so family is to pt's L side   Educated pt's family on safety, fall prevention, and to contact [facility] staff when pt is ready to return to bed . Pt's family state they will not leave pt alone in chair   Bed Mobility:  Rolling:    Supine<>Sit:    Sit<>Supine:      Transfer Mobility:  Sit<>Stand: min A     Stand<>Sit: min A    Gait: mod A c RW     Therapist's Comments:           Patient End of Session: Up in chair;Needs met;Call light within reach;RN aware of session/findings;All patient questions and concerns addressed;Family present    PT Session Time: 29 minutes  Gait Training: 15 minutes  Therapeutic Activity: 14 minutes  Therapeutic Exercise:  minutes   Neuromuscular Re-education:  minutes

## 2024-05-28 NOTE — DISCHARGE PLANNING
NURSING DISCHARGE NOTE    Discharged Home via Wheelchair.  Accompanied by Family member and Support staff  Belongings Taken by patient/family.  IV and tele removed. Went over discharge paper work with family.

## 2024-05-28 NOTE — PLAN OF CARE
Assumed patient care at 0730. A/OxSelf, baseline. Neuros Qshift. Room air. Normal sinus on tele. Carb control diet, QID accu. PT evaulated. Family at bedside. L upper arm @75ml/hr. MRI completed. Plans for discharge today. All safety precautions are in place and will continue with plan of care    Problem: Delirium  Goal: Minimize duration of delirium  Description: Interventions:  - Encourage use of hearing aids, eye glasses  - Promote highest level of mobility daily  - Provide frequent reorientation  - Promote wakefulness i.e. lights on, blinds open  - Promote sleep, encourage patient's normal rest cycle i.e. lights off, TV off, minimize noise and interruptions  - Encourage family to assist in orientation and promotion of home routines  5/28/2024 1156 by Dolores Rocha RN  Outcome: Adequate for Discharge  5/28/2024 1156 by Dolores Rocha RN  Outcome: Progressing     Problem: Safety Risk - Non-Violent Restraints  Goal: Patient will remain free from self-harm  Description: INTERVENTIONS:  - Apply the least restrictive restraint to prevent harm  - Notify patient and family of reasons restraints applied  - Assess for any contributing factors to confusion (electrolyte disturbances, delirium, medications)  - Discontinue any unnecessary medical devices as soon as possible  - Assess the patient's physical comfort, circulation, skin condition, hydration, nutrition and elimination needs   - Reorient and redirection as needed  - Assess for the need to continue restraints  5/28/2024 1156 by Dolores Rocha RN  Outcome: Adequate for Discharge  5/28/2024 1156 by Dolores Rocha RN  Outcome: Progressing

## 2024-05-28 NOTE — PLAN OF CARE
Assumed care at 1930  Albanian speaking, Alert to self  RA  NSR on tele  Cardiac electrolyte protocol  QID accucheck, Carb controlled pureed/ thin liq, medications crushed in applesauce  Up w/ virgie steady, continent  PIV L Upper arm infusing 0.9 @ 75ml/hr  Qshift Neurochecks  PT eval and MRI/ MRA needed  Call light within reach, safety precautions maintained, family at bedside  POC ongoing    Problem: Delirium  Goal: Minimize duration of delirium  Description: Interventions:  - Encourage use of hearing aids, eye glasses  - Promote highest level of mobility daily  - Provide frequent reorientation  - Promote wakefulness i.e. lights on, blinds open  - Promote sleep, encourage patient's normal rest cycle i.e. lights off, TV off, minimize noise and interruptions  - Encourage family to assist in orientation and promotion of home routines  Outcome: Progressing     Problem: Diabetes/Glucose Control  Goal: Glucose maintained within prescribed range  Description: INTERVENTIONS:  - Monitor Blood Glucose as ordered  - Assess for signs and symptoms of hyperglycemia and hypoglycemia  - Administer ordered medications to maintain glucose within target range  - Assess barriers to adequate nutritional intake and initiate nutrition consult as needed  - Instruct patient on self management of diabetes  Outcome: Progressing

## 2024-05-28 NOTE — CONSULTS
.[facility]    REKHA Meza Patient Status:  Inpatient    5/15/1940 MRN [id number]   Location [facility] 3NE-A Attending Sheri Solis MD   Hosp Day # 3 PCP None Pcp     Patient Identification  REKHA Meza is a 84 year old male.  :  5/15/1940  Admit Date:  2024  Attending Provider:  Sheri Solis MD                                  Primary Care Physician:  None Pcp   Admitting Diagnosis: Gait disturbance [R26.9]  Intracranial hemorrhage (HCC) [I62.9]    Subjective:      Reason for Consultation: Impaired ADL and mobility dysfuction due to Hemorrhagic CVA  History of present illness:  Records reviewed, and patient examined.Consult Requested by:     Patient is a 84 year old male with hypertension, hyperlipidemia and dementia was flown from [place] and brought directly to the ER on 24 from the airport.  Patient apparently had a right hemorrhagic stroke in [place] 2 weeks ago.  Patient was subsequently hospitalized and discharged and was admitted for ongoing care.  Was admitted with agitation and confusion.        CT Brain 24 CONCLUSION:  Right frontal lobe abnormality consistent with intraparenchymal hemorrhage with faint high density blood components.     Seen by Neurology. Also seen by Psychiatry  Started on Zyprexa prn.    EEG done,Suspicion of ongoing complex partial seizures.  Patient already on Keppra to 500 mg twice daily.   Continuous EEG did not show any evidence of electrographic seizures.      MRI/MRA 24 CONCLUSION:     1. Resolving right frontal hematoma.    2.  Probable subacute to chronic tiny right parietal hematoma.    3. Several small foci of susceptibility signal seen on diffusion-weighted imaging within the subcortical white matter of the frontal and parietal lobes.  This could indicate old parenchymal hemorrhages and therefore amyloid angiopathy would be within the    differential diagnosis of this acute hemorrhage.    4. Old left cerebellar  infarct.    5. Unremarkable MRA of the dgyjuy-pc-Uuwmkp and neck.     Currently off anti-psychotics. On Melatonin and Namenda.  Mentation improving.  No seizures/headaches    On Pureed/thins    Physiatry consult obtained now to assess pt's funtional status and make appropriate recommendations.      HOME SITUATION  Type of Home: House   Home Layout: Two level  Stairs to Bedroom:  (Flight)  Railing: Yes     Lives With: Family  Drives: No  Patient Owned Equipment: Rolling walker;Cane     Prior Level of [place]: Pt's son assisted in providing PLOF. Pt reports that pt lives in a house with his son and son's family. Pt was IND with all ADLs and mobility. Pt did not use a assisted device. Pt was able to be left alone at time.    Current Functional Status:     Bed Mobility:  Rolling: Min A  Supine to sit: Mod A          Sit to supine: Mod A             Transfer Mobility:  Sit to stand: Min A             Stand to sit: Min A  Gait = Min a 70ft using RW     Gait Assessment   Functional Mobility/Gait Assessment  Gait Assistance: Minimum assistance  Distance (ft): 70  Assistive Device: Rolling walker  Pattern: Shuffle (Drift towards the R)    Past Medical History:  @Medical hx@  Past Medical History:    Dementia (HCC)    Essential hypertension    Hyperlipidemia       Past Surgical History:   Procedure Laterality Date    Eeg phy/qhp ea incr w/veeg  2024        [COMPLETED] gadoterate meglumine (Dotarem) 7.5 MMOL/15ML injection 15 mL  15 mL Intravenous ONCE PRN    sodium chloride 0.9% infusion   Intravenous Continuous    melatonin tab 1 mg  1 mg Oral Nightly    [COMPLETED] acetaminophen (Tylenol Extra Strength) tab 1,000 mg  1,000 mg Oral Once    [] dextrose 5%-sodium chloride 0.45% infusion   Intravenous Continuous    [COMPLETED] OLANZapine (Zyprexa) 5 mg in sterile water for injection (PF) IM injection  5 mg Intramuscular Once    metoprolol succinate ER (Toprol XL) 24 hr tab 25 mg  25 mg Oral Daily Beta Blocker     memantine (Namenda) tab 10 mg  10 mg Oral BID    losartan (Cozaar) tab 50 mg  50 mg Oral Daily    levETIRAcetam (Keppra) tab 250 mg  250 mg Oral BID    atorvastatin (Lipitor) tab 20 mg  20 mg Oral Nightly    amLODIPine (Norvasc) tab 2.5 mg  2.5 mg Oral BID    glucose (Dex4) 15 GM/59ML oral liquid 15 g  15 g Oral Q15 Min PRN    Or    glucose (Glutose) 40% oral gel 15 g  15 g Oral Q15 Min PRN    Or    glucose-vitamin C (Dex-4) chewable tab 4 tablet  4 tablet Oral Q15 Min PRN    Or    dextrose 50% injection 50 mL  50 mL Intravenous Q15 Min PRN    Or    glucose (Dex4) 15 GM/59ML oral liquid 30 g  30 g Oral Q15 Min PRN    Or    glucose (Glutose) 40% oral gel 30 g  30 g Oral Q15 Min PRN    Or    glucose-vitamin C (Dex-4) chewable tab 8 tablet  8 tablet Oral Q15 Min PRN    insulin aspart (NovoLOG) 100 Units/mL FlexPen 1-10 Units  1-10 Units Subcutaneous TID AC and HS    acetaminophen (Tylenol Extra Strength) tab 500 mg  500 mg Oral Q4H PRN    melatonin tab 3 mg  3 mg Oral Nightly PRN    glycerin-hypromellose- (Artificial Tears) 0.2-0.2-1 % ophthalmic solution 1 drop  1 drop Both Eyes QID PRN    sodium chloride (Saline Mist) 0.65 % nasal solution 1 spray  1 spray Each Nare Q3H PRN    ondansetron (Zofran) 4 MG/2ML injection 4 mg  4 mg Intravenous Q6H PRN    QUEtiapine (SEROquel) tab 25 mg  25 mg Oral Nightly PRN       Social History     Tobacco Use    Smoking status: Never    Smokeless tobacco: Never   Substance Use Topics    Alcohol use: Never       History reviewed. No pertinent family history.    Allergies:  No Known Allergies        Lab Results   Component Value Date    PGLU 122 05/28/2024       Review of Systems:  Complete review of systems completed/14 point.  Negative except as that outlined in HPI    OBJECTIVE:    Blood pressure 146/76, pulse 66, temperature 97.8 °F (36.6 °C), temperature source Oral, resp. rate 16, height 5' 7\" (1.702 m), weight 140 lb (63.5 kg), SpO2 98%.  No intake or output data in the  24 hours ending 05/28/24 1219    Physical Exam:                                      General: Alert, cooperative, no distress, appears stated age.  Head:  Normocephalic, without obvious abnormality, atraumatic.   Eyes:  Conjunctivae/lids clear. PERRL, EOMs intact. Vision functional.   Ears/Nose/Throat: Hearing intact. Lips, mucosa, and tongue normal. Teeth and gums normal. Moist mucous membranes.     Neck: No neck masses or thyroid enlargement/tenderness/nodules.       Lungs:   Resonant, clear breath sounds, quiet accessory muscles.   Chest wall:  No tenderness or deformity.   Cardiovascular:  Heart with regular rate rhythm, no murmurs appreciated. Radial and pedal pulses good. No cyanosis in all extremities.   Abdomen:   No tenderness guarding or rigidity. Liver and spleen are not enlarged.  Bowel sounds present.                   Musculoskeletal:     Right Upper Extremity:  Strength is 4-5.  ROM WNL.   Left Upper Extremity:  Strength is 3-4.  ROM WNL.   Right Lower Extremity:  Strength  is 4.   ROM WNL.   Left Lower Extremity: Strength  is 3.   ROM WNL.          Neuro: CNII-XII are grossly intact. Sensation to dull touch intact in all extremities                   Psychiatric: Awake, alert and oriented        Assessment:                                Rehab diagnosis: ADL and  mobility dysfunction due to R Frontal Hemorrhagic CVA    Disposition:     Based on pt's current functional and medical status, Acute inpatient rehabilitation is recommended to maximize patient's independence, provide care giver training, and evaluate for home equipment needs with ELOS 2-3 weeks.         Patient would benefit and is able to participate in 3 hours of therapy daily. Patient is anticipated to discharge to home when acute inpatient rehabilitation course is complete.              Thank you for the consult.     Trinidad Rand MD  [facility].

## 2024-05-28 NOTE — PROGRESS NOTES
[facility]  Report of Psychiatric Progress Note    REKHA Meza Patient Status:  Inpatient    5/15/1940 MRN [id number]   Location [facility] 3NE-A Attending Sheri Solis MD   Hosp Day # 3 PCP None Pcp     Date of Admission: 24  Date of Service: 24   Reason for Consultation: Altered mental status    Impression:  Primary Psychiatric Diagnosis:  Acute delirium/encephalopathy likely due to subacute intracerebral bleed (Rt frontal ICH from fall 2 wks ago) +/- recent seizures (on keppra now, no evidence of active seizures on EEG), and poor sleep quality in context of underlying dementia. IMPROVED. Alert and more attentive now.    Major neurocognitive disorder, probable Alzheimer's and vascular, probable mild to moderate now. No hx of behavioral disturbance (no paranoia or hallucinations or aggression) prior to the ICH.     Pertinent Medical Diagnoses:  Subacute ICH. HTN. HLD.    Recommendations:  1) Continue Melatonin 1mg nightly to help with sleep.    2) Continue Namenda 10mg po twice a day for dementia.    3) He can be discharged home from the psych perspective. Family (son and his girlfriend, 2 grand daughters, and another son) will provide 24 hr supervision.     4) No need for antipsychotic trials unless he develops agitation related to dementia and not delirium. No need for the inpatient psych unit. He has NOT been combative in the hospital.     Bradley Montiel MD    Subjective:    Interval Hx:  2024- He slept well with the Melatonin 1mg nightly per report. He did not receive the Seroquel 25mg nightly scheduled. His last Seroquel was on  and last Zyprexa prn was on  in the early AM.     He is oriented to self and hospital only. He recognizes his son at the bedside. He says yes to feeling some anxiety. Yes to feeling depressed because he wants to go home (son's house where he lives). He denies voices/visions or paranoia. He has NOT been combative.    2024- He slept last night  with the melatonin 1mg nightly per report. Per son who stayed overnight, the patient only woke up once and was able to go back to sleep. No agitation or combativeness. He is eating/drinking.     Today, he says no to having anxiety. He says yes to having some depressed mood because he misses seeing some of his sons who live in [place]. Son tells me how he has 14 children-- 4 sons are in the [place] and more are in [place]. No to having voices/visions/paranoia/suicidal ideation.     Past Medical History:    Dementia (HCC)    Essential hypertension    Hyperlipidemia     Past Surgical History:   Procedure Laterality Date    Eeg phy/qhp ea incr w/veeg  5/27/2024     History reviewed. No pertinent family history.   reports that he has never smoked. He has never used smokeless tobacco. He reports that he does not drink alcohol and does not use drugs.    Allergies:  No Known Allergies    Medications:    Current Facility-Administered Medications:     sodium chloride 0.9% infusion, , Intravenous, Continuous    melatonin tab 1 mg, 1 mg, Oral, Nightly    metoprolol succinate ER (Toprol XL) 24 hr tab 25 mg, 25 mg, Oral, Daily Beta Blocker    memantine (Namenda) tab 10 mg, 10 mg, Oral, BID    losartan (Cozaar) tab 50 mg, 50 mg, Oral, Daily    levETIRAcetam (Keppra) tab 250 mg, 250 mg, Oral, BID    atorvastatin (Lipitor) tab 20 mg, 20 mg, Oral, Nightly    amLODIPine (Norvasc) tab 2.5 mg, 2.5 mg, Oral, BID    glucose (Dex4) 15 GM/59ML oral liquid 15 g, 15 g, Oral, Q15 Min PRN **OR** glucose (Glutose) 40% oral gel 15 g, 15 g, Oral, Q15 Min PRN **OR** glucose-vitamin C (Dex-4) chewable tab 4 tablet, 4 tablet, Oral, Q15 Min PRN **OR** dextrose 50% injection 50 mL, 50 mL, Intravenous, Q15 Min PRN **OR** glucose (Dex4) 15 GM/59ML oral liquid 30 g, 30 g, Oral, Q15 Min PRN **OR** glucose (Glutose) 40% oral gel 30 g, 30 g, Oral, Q15 Min PRN **OR** glucose-vitamin C (Dex-4) chewable tab 8 tablet, 8 tablet, Oral, Q15 Min PRN    insulin aspart  (NovoLOG) 100 Units/mL FlexPen 1-10 Units, 1-10 Units, Subcutaneous, TID AC and HS    acetaminophen (Tylenol Extra Strength) tab 500 mg, 500 mg, Oral, Q4H PRN    melatonin tab 3 mg, 3 mg, Oral, Nightly PRN    glycerin-hypromellose- (Artificial Tears) 0.2-0.2-1 % ophthalmic solution 1 drop, 1 drop, Both Eyes, QID PRN    sodium chloride (Saline Mist) 0.65 % nasal solution 1 spray, 1 spray, Each Nare, Q3H PRN    ondansetron (Zofran) 4 MG/2ML injection 4 mg, 4 mg, Intravenous, Q6H PRN    QUEtiapine (SEROquel) tab 25 mg, 25 mg, Oral, Nightly PRN    Review of Systems   Psychiatric/Behavioral:  Negative for suicidal ideas.      Mental Status Exam:     Objective      Vitals:    05/28/24 0740   BP: 146/76   Pulse: 66   Resp: 16   Temp: 97.8 °F (36.6 °C)     Appearance: fair grooming, in no acute distress  Behavior: normal psychomotor, good eye contact today  Attitude: cooperative  Gait: not observed    Speech: soft, fluent    Mood: No to feeling anxious, yes to having depressed mood because he misses his sons who live in [place]  Affect: Congruent    Thought process: linear to some questions  Thought content: no hallucinations    Orientation: oriented person, place  Attention and Concentration: fair today  Memory: impaired recent, intact remote  Language: unable to assess naming or repetition    Insight: limited  Judgment: limited    Laboratory Data:  Lab Results   Component Value Date    PGLU 105 05/28/2024

## 2024-05-28 NOTE — HOME CARE LIAISON
[facility] Spoke w/ patients son who is agreeable with [facility]. Contact information placed on AVS.

## 2024-05-28 NOTE — CM/SW NOTE
05/28/24 1456   Choice of Post-Acute Provider   Informed patient of right to choose their preferred provider Yes   List of appropriate post-acute services provided to patient/family with quality data Yes   Patient/family choice [facility]   Information given to Son     Notified by RN pt's son has selected [facility]. [facility] reserved in [facility] and contact information placed on AVS. Notified [facility] liaison Roxana of pt's discharge today. SW will continue to remain available.     MINNIE Sampson  Discharge Planner

## 2024-05-28 NOTE — PROGRESS NOTES
[facility]   part of [facility]     Hospitalist Progress Note     REKHA Meza Patient Status:  Inpatient    5/15/1940 MRN [id number]   Location [facility] 3NE-A Attending Sheri Solis MD   Hosp Day # 3 PCP None Pcp     Chief Complaint: Agitation    Subjective:     Patient less agitated, family would like to take him home.    Objective:    Review of Systems:   Unable to obtain    Vital signs:  Temp:  [97.3 °F (36.3 °C)-97.8 °F (36.6 °C)] 97.8 °F (36.6 °C)  Pulse:  [59-66] 66  Resp:  [16-18] 16  BP: (113-146)/(48-76) 146/76  SpO2:  [93 %-98 %] 98 %    Physical Exam:    General: No acute distress, restrained, confused  Respiratory: No wheezes, no rhonchi  Cardiovascular: S1, S2, regular rate and rhythm  Abdomen: Soft, Non-tender, non-distended, positive bowel sounds  Neuro: No new focal deficits.   Extremities: No edema      Diagnostic Data:    Labs:  Recent Labs   Lab 24  1203 24  0908   WBC 8.3 6.9   HGB 12.5* 12.8*   MCV 94.8 95.0   .0 252.0       Recent Labs   Lab 24  1203 24  0908   * 115*   BUN 27* 20   CREATSERUM 1.87* 1.65*   CA 9.9 9.8   ALB 4.1  --    * 139   K 5.5* 5.1    108   CO2 25.0 28.0   ALKPHO 59  --    AST 26  --    ALT 24  --    BILT 0.6  --    TP 8.0  --        Estimated Creatinine Clearance: 29.4 mL/min (A) (based on SCr of 1.65 mg/dL (H)).    No results for input(s): \"TROP\", \"TROPHS\", \"CK\" in the last 168 hours.    No results for input(s): \"PTP\", \"INR\" in the last 168 hours.               Microbiology    No results found for this visit on 24.      Imaging: Reviewed in Epic.    Medications:    melatonin  1 mg Oral Nightly    metoprolol succinate ER  25 mg Oral Daily Beta Blocker    memantine  10 mg Oral BID    losartan  50 mg Oral Daily    levETIRAcetam  250 mg Oral BID    atorvastatin  20 mg Oral Nightly    amLODIPine  2.5 mg Oral BID    insulin aspart  1-10 Units Subcutaneous TID AC and HS       Assessment & Plan:       #Recent fall with right frontal ICH, subacute  PT/OT/ST when able  Avoid blood thinners, antiplatelets  Cont empiric Keppra  MRI/MRA pending  EEG negative for active seizures  Neuro and psych following     #Acute encephalopathy on chronic dementia, likely delirium due to above  Seroquel PRN, Melatonin  Namenda  Improved   Psychiatry following, does not need inpt psych anymore     #DMII, hyperglycemia protocol, A1c 6.4    #HTN, controlled     #DL, statin    #REBEKAH on CKD, gentle IVF, improved    #Dispo, home likely today following MRI         Sheri Solis MD    Supplementary Documentation:     Quality:  DVT Mechanical Prophylaxis:   SCDs,    DVT Pharmacologic Prophylaxis   Medication   None                Code Status: Not on file  Guillen: No urinary catheter in place  Guillen Duration (in days):   Central line:    BREA: 5/28/2024    Discharge is dependent on: progress  At this point Mr. Meza is expected to be discharge to: tbd    The 21st Century Cures Act makes medical notes like these available to patients in the interest of transparency. Please be advised this is a medical document. Medical documents are intended to carry relevant information, facts as evident, and the clinical opinion of the practitioner. The medical note is intended as peer to peer communication and may appear blunt or direct. It is written in medical language and may contain abbreviations or verbiage that are unfamiliar.             **Certification      PHYSICIAN Certification of Need for Inpatient Hospitalization - Initial Certification    Patient will require inpatient services that will reasonably be expected to span two midnight's based on the clinical documentation in H+P.   Based on patients current state of illness, I anticipate that, after discharge, patient will require TBD.

## 2024-05-28 NOTE — PROGRESS NOTES
History of Presenting Illness:  Patient seen for follow-up patient's son available at the time of visit.  He is doing much better today, more responsive.  No new weakness.      Vitals:   Vitals:    05/28/24 0740   BP: 146/76   Pulse: 66   Resp: 16   Temp: 97.8 °F (36.6 °C)          Examination:    Awake , responsive, follows family members request cues, dysarthric,    Pupils round and reactive to light, extra ocular movement normal, no facial weakness, hearing normal.  Motor strength and reflexes symmetrical.  Finger to Nose testing normal.     Investigations:    MRI BRAIN MRA HEAD+MRA NECK (ALL W+WO) (CPT=70553/24474/11715)    Result Date: 5/28/2024  CONCLUSION:   1. Resolving right frontal hematoma.  2.  Probable subacute to chronic tiny right parietal hematoma.  3. Several small foci of susceptibility signal seen on diffusion-weighted imaging within the subcortical white matter of the frontal and parietal lobes.  This could indicate old parenchymal hemorrhages and therefore amyloid angiopathy would be within the  differential diagnosis of this acute hemorrhage.  4. Old left cerebellar infarct.  5. Unremarkable MRA of the ijpzlu-by-Ghtniw and neck.   LOCATION:  [facility]   Dictated by (CST): Josiah Pulido MD on 5/28/2024 at 9:48 AM     Finalized by (CST): Josiah Pulido MD on 5/28/2024 at 10:04 AM            Lab Results   Component Value Date    A1C 6.4 (H) 05/25/2024        No results found for: \"LDL\", \"HDL\", \"TRIG\"     Recent Labs   Lab 05/25/24  1203 05/26/24  0908   RBC 4.00 4.22   HGB 12.5* 12.8*   HCT 37.9* 40.1   MCV 94.8 95.0   MCH 31.3 30.3   MCHC 33.0 31.9   RDW 12.7 12.6   NEPRELIM 6.59 4.70   WBC 8.3 6.9   .0 252.0       Recent Labs   Lab 05/25/24  1203 05/26/24  0908   * 115*   BUN 27* 20   CREATSERUM 1.87* 1.65*   EGFRCR 35* 40*   CA 9.9 9.8   * 139   K 5.5* 5.1    108   CO2 25.0 28.0         Impression/MDM.    Subacute intracerebral bleed.  CT evidence of resolution with mild  perilesional edema noted.  Advise MRI brain with MRA of head and neck reviewed.  Normotensive blood pressure, neurochecks, OT/PT/rehab eval completed.  Family members want to take the patient home with in-house rehabilitation.  Complex partial seizures.  Keppra to 500 mg twice daily.       History of underlying dementia with behavioral disorder.  Patient currently on memantine and quetiapine.  Advised to continue medication.  Case discussed with Dr. Montiel personally.  Family wants to take the patient home for inpatient rehabilitation.  Nursing staff appraised.  Patient to follow-up with neurosurgery clinic 2 weeks after discharge.  Patient to have repeat CT scan prior to discharge.

## 2024-05-28 NOTE — CDS QUERY
Dear Doctor Will,    Can Encephalopathy be further specified    [   ] Acute Metabolic Encephalopathy   [   ] Other Encephalopathy (please specify the cause): ________________    CLINICAL INDICATORS: 5/25 84 Y/M- Pt brought in by family. A week ago while in [place], pt had a fall, resulted in a hemorrhagic stroke, Interventions unknown.      ED quick note: mental status/LOC at time of transport: x1 self     ED- The patient was brought in by family members directly from the airport where patient return from [place].  The patient a hemorrhagic stroke involving the right frontal lobe in [place] 2 weeks ago.  The patient was hospitalized there and then subsequently discharged without any specific plan on ongoing management or supportive care.  The family states that he does have a history of dementia, but his cognition has significantly worsened since the hemorrhagic stroke.  Additionally, the patient is having quite significant difficulty ambulating.  Patient's head CT from [place] was reviewed and there appears to be a sizable right frontal parenchymal hemorrhage noted.    MRI brain- Resolving right frontal hematoma. probable subacute to chronic tiny right parietal hematoma.     5/25 NN- Restless, agitated and attempting to get out of bed and pulling at pulse ox. Sitter ordered   5/26 NN- 1:1 sitter and posey vest restraint in place for pt safety due to confusion & impulsiveness. Pt needs frequent reminding to stay in bed.   5/28 NN- Assumed patient care at 0730. A/OxSelf, baseline.     5/25-5/28 Hospitalist- #Recent fall with IVH   #Acute encephalopathy on chronic dementia, likely delirium due to above   Improved    5/28 psych- Acute delirium/encephalopathy likely due to subacute intracerebral bleed (Rt frontal ICH from fall 2 wks ago) +/- recent seizures (on keppra now, no evidence of active seizures on EEG), and poor sleep quality in context of underlying dementia. IMPROVED. Alert and more attentive now.     RISK  FACTORS: intracranial hemorrhage, fall, headache     TREATMENT: keppra, neuro checks, EEG, MRI brain, CT, avoid blood thinners/antiplatelets, memantine and quetiapine, psych consult, sitter    Criteria for Types of acute encephalopathy: References by national institute of neurologic disorders and stroke: NINDS Encephalopathy information  Metabolic due to such things as fever, dehydration, electrolyte imbalance, hypoglycemia, hypoxemia, infection and organ failure.  Toxic- refers to the effects of drugs and toxins  Toxic-metabolic is a combination of toxic and metabolic factors  Septic encephalopathy is a manifestation of severe sepsis, which is a specific type of metabolic encephalopathy  Hepatic encephalopathy is due to elevated blood ammonia levels and describes a spectrum of neurologic impairment (e.g. altered mental status, combativeness) in patients with severe end stage liver disease.  Hypertensive encephalopathy is an acute or subacute consequence of severe hypertension marked by headache, obtundation, confusion or stupor, with or without convulsions. Papilloma may be noted.     Use of terms such as suspected, possible, or probable (associated with a specific diagnosis that is being evaluated, monitored, or treated as if it exists) are acceptable and can be coded in the inpatient setting, when documented at the time of discharge.     Please add any additional documentation to your progress note and continue to document this through discharge.      For questions, please contact Clinical : Anastacia Valentin -[phone]. Thank You.      THIS FORM IS A PERMANENT PART OF THE MEDICAL RECORD

## 2024-05-28 NOTE — CDS QUERY
Dear Dr Solis,      Can the etiology of intracranial hemorrhage with fall be further clarified. Select type of etiology and indicate status of consciousness     [   ] Traumatic, Please \"X\":  [  ] with loss of consciousness [   ] without loss of consciousness  [  ] loss of consciousness status unknown    [   ] Nontraumatic, Please \"X\": [  ] with loss of consciousness [   ] without loss of consciousness    [   ] Other (please specify): _________________    [   ] Unable to clinically determine               CLINICAL INDICATORS: 5/25 84 Y/M- Pt brought in by family. A week ago while in [place], pt had a fall, resulted in a hemorrhagic stroke, Interventions unknown.     ED quick note: mental status/LOC at time of transport: x1 self    ED- The patient was brought in by family members directly from the airport where patient return from [place].  The patient a hemorrhagic stroke involving the right frontal lobe in [place] 2 weeks ago.  The patient was hospitalized there and then subsequently discharged without any specific plan on ongoing management or supportive care.  The family states that he does have a history of dementia, but his cognition has significantly worsened since the hemorrhagic stroke.  Additionally, the patient is having quite significant difficulty ambulating.  Patient's head CT from [place] was reviewed and there appears to be a sizable right frontal parenchymal hemorrhage noted.     MRI: patient stated history- fell a few weeks ago and hit head. Left sided head pain per family.   Resolving right frontal hematoma. probable subacute to chronic tiny right parietal hematoma.    Hospitalist- Pt was apparently in [place] and fell. He suffered a IVH and was hospitalized for about 2 weeks. His family brought him back from [place] overnight and brought him into the ER. He is agitated and confused. He is unable to #Recent fall with IVH     RISK FACTORS: intracranial hemorrhage, fall, headache    TREATMENT: Neurology  consult, Keppra, CT Brain, MRI brain, Neuro checks, PT/OT/ST, avoid blood thinners/antiplatelets, EEG    Use of terms such as suspected, possible, or probable (associated with a specific diagnosis that is being evaluated, monitored, or treated as if it exists) are acceptable and can be coded in the inpatient setting, when documented at the time of discharge.     Please add any additional documentation to your progress note and continue to document this through discharge.    For questions, please contact Clinical : Anastacia Valentin -[phone]. Thank You.                                                            THIS FORM IS A PERMANENT PART OF THE MEDICAL RECORD

## 2024-05-28 NOTE — CM/SW NOTE
05/28/24 0900   CM/SW Referral Data   Referral Source Social Work (self-referral)   Reason for Referral Discharge planning   Patient Info   Patient's Home Environment House   Patient Status Prior to Admission   Independent with ADLs and Mobility Yes   Discharge Needs   Anticipated D/C needs To be determined   Services Requested   PMR Consult Requested Consult ordered     HOME SITUATION  Type of Home: House   Home Layout: Two level  Stairs to Bedroom:  (Flight)  Railing: Yes     Lives With: Family  Drives: No  Patient Owned Equipment: Rolling walker;Cane     Prior Level of [place] per PT eval: Pt's son assisted in providing PLOF. Pt reports that pt lives in a house with his son and son's family. Pt was IND with all ADLs and mobility. Pt did not use a assisted device. Pt was able to be left alone at time.    Pt is a 83 y/o male admitted with intracranial hemorrhage. Chart reviewed and noted PT has evaluated pt for therapy needs at discharge.     Anticipated therapy need: Intensive Therapy Program  PMR consult placed. CELY messaged  liaison Harper to notify her of new PMR consult.     Await PMR consult for further recommendations. SW will f/u with pt/family regarding discharge plan.     Addendum (1pm) - CELY noted PMR recommendation for acute inpatient rehab. CELY sent AR referral in AIDIN and spoke with  liaison who stated  can accept. CELY met with pt, pt's two sons, pt's dtr, and pt's CHACORTA at bedside to discuss discharge plan.    Pt's son (Kenneth) stated pt lives with him and is typically independent at baseline. Pt's son stated pt's family plans to provide 24 hr supervision to pt at home. Discussed AR, pt's son declined and stated he would rather take pt home at discharge. Pt's son agreeable to  services. Pt's son denied any further needs at this time.     HH referral sent in AIDIN, choice list will be provided once available. CELY will continue to follow.     Addendum (2:30pm) - CELY provided pt's son Kenneth  with HH choice list at bedside. SW informed pt HH choice is needed prior to discharge. Pt's son stated pt's PCP is Dr. Drew Myers. Pt's son stated he will review HH choice list. Updated RN.     MINNIE Sampson  Discharge Planner

## 2024-05-29 ENCOUNTER — PATIENT OUTREACH (OUTPATIENT)
Dept: CASE MANAGEMENT | Age: 84
End: 2024-05-29

## 2024-05-29 NOTE — PROGRESS NOTES
Initial Post Discharge Follow Up   Discharge Date: 5/28/24  Contact Date: 5/29/2024    Consent Verification:  Assessment Completed With: Other: Son Kenneth  Permission received per patient?  verbal  HIPAA Verified?  Yes    Discharge Dx:   #Recent fall with IVH   #Acute encephalopathy on chronic dementia, likely delirium due to above   #DMII, hyperglycemia protocol  #HTN, controlled  #DL, statin  #REBEKAH on CKD, gentle IVF, f/u BMP in am    General:   How have you been since your discharge from the hospital? NCM spoke with pt's son states pt is feeling very well. Pt denies any fevers, chills, nausea, vomiting, shortness of breath, chest pain or any other symptoms. Pt is walking around at home just fine. He denies feeling dizzy or lightheaded.   Do you have any pain since discharge?  No    How well was your pain managed while in the hospital?   On a scale of 1-5   1- Very Poor and 5- Very well   5  When you were leaving the hospital were your discharge instructions reviewed with you? Yes  How well were your discharge instructions explained to you?   On a scale of 1-5   1- Very Poor and 5- Very well   5  Do you have any questions about your discharge instructions?  No  Before leaving the hospital was your diagnoses explained to you? Yes  Do you have any questions about your diagnoses? No  Are you able to perform normal daily activities of living as you have prior to your hospital stay (dressing, bathing, ambulating to the bathroom, etc)? yes  (HILARIA) Was patient given a different diet per AVS? no      Medications: Reviewed medication list.  Medications are up to date.  Current Outpatient Medications   Medication Sig Dispense Refill    losartan 50 MG Oral Tab Take 1 tablet (50 mg total) by mouth daily. 30 tablet 1    melatonin 1 MG Oral Tab Take 1 tablet (1 mg total) by mouth nightly. 30 tablet 1    glipiZIDE 5 MG Oral Tab Take 1 tablet (5 mg total) by mouth daily. Takes 2.5mg PO daily with breakfast      levETIRAcetam 500  MG Oral Tab Take 1 tablet (500 mg total) by mouth 2 (two) times daily.      amLODIPine Besylate 2.5 MG Oral Tab Take 2 tablets (5 mg total) by mouth daily.      aspirin 81 MG Oral Tab EC Take 1 tablet (81 mg total) by mouth daily.      Memantine HCl 10 MG Oral Tab Take 1 tablet (10 mg total) by mouth 2 (two) times daily.      atorvastatin 20 MG Oral Tab Take 1 tablet (20 mg total) by mouth nightly.      Metoprolol Succinate ER 25 MG Oral Tablet 24 Hr Take 1 tablet (25 mg total) by mouth daily.       Were there any changes to your current medication(s) noted on the AVS? Yes  If so, were these medication changes discussed with you prior to leaving the hospital? Yes  If a new medication was prescribed:    Was the new medication's purpose & side effects reviewed? Yes  Do you have any questions about your new medication? No  Did you  your discharge medications when you left the hospital? Yes  Let's go over your medications together to make sure we are not missing anything. Medications Reviewed  Are there any reasons that keep you from taking your medication as prescribed? No  Are you having any concerns with constipation? No      Discharge medications reviewed/discussed/and reconciled against outpatient medications with patient.  Any changes or updates to medications sent to PCP.  Patient Acknowledged     Referrals/orders at D/C:  Referrals/orders placed at D/C? no    DME ordered at D/C? No      Discharge orders, AVS reviewed and discussed with patient. Any changes or updates to orders sent to PCP.  Patient Acknowledged      SDOH:   Transportation Needs: No Transportation Needs (5/25/2024)    Transportation Needs     Lack of Transportation: No     Car Seat: Not on file     Financial Resource Strain: Low Risk  (5/29/2024)    Financial Resource Strain     Difficulty of Paying Living Expenses: Not hard at all     Med Affordability: Not on file           Follow up appointments:      Your appointments       Date & Time  Appointment Department ([place])    Jun 11, 2024 7:30 PM CDT CT BRAIN OR HEAD with EH CT MAIN RM4 [facility] CT ([facility])    Please arrive 15 minutes prior to your scheduled appointment time.            [facility] CT  [facility]  [address] [address]  [phone]            TCC  Was TCC ordered: Yes  Was TCC scheduled: No, Explain: pt declined will follow up with outside PCP first.       PCP (If no TCC appointment)  Does patient already have a PCP appointment scheduled? No      Specialist    Does the patient have any other follow up appointment(s) needing to be scheduled? Yes  If yes: NCM reviewed upcoming specialist appointment with patient: Yes  Does the patient need assistance scheduling appointment(s): No    Is there any reason as to why you cannot make your appointment(s)?  No     Needs post D/C:   Now that you are home, are there any needs or concerns you need addressed before your next visit with your PCP?  (DME, meds, questions, etc.): No    Interventions by NCM:   All discharge instructions reviewed with the patient. Reviewed when to call MD vs when to call 911 or go the ED. Educated patient on the importance of taking all meds as prescribed as well as close f/u with PCP/specialists. Pt verbalized understanding and will contact the office with any further questions or concerns. Patient denies fevers, chills, nausea, vomiting, shortness of breath, chest pain, or any other symptoms at this time.  NCM attempted to schedule HFU, patient declined will call follow up with Outside PCP.  NCM provided contact information for any further questions/concerns. Patient verbalized understanding and agreeable.       Overall Rating:   How would you rate the care you received while in the hospital? good    CCM referral placed:    No    BOOK BY DATE: 06/11/24

## 2024-06-04 ENCOUNTER — HOSPITAL ENCOUNTER (EMERGENCY)
Facility: HOSPITAL | Age: 84
Discharge: HOME | End: 2024-06-04
Attending: EMERGENCY MEDICINE
Payer: MEDICAID

## 2024-06-04 VITALS
OXYGEN SATURATION: 99 % | RESPIRATION RATE: 16 BRPM | BODY MASS INDEX: 25.66 KG/M2 | HEART RATE: 64 BPM | HEIGHT: 65 IN | TEMPERATURE: 98 F | DIASTOLIC BLOOD PRESSURE: 55 MMHG | WEIGHT: 154 LBS | SYSTOLIC BLOOD PRESSURE: 142 MMHG

## 2024-06-04 DIAGNOSIS — R35.0 URINARY FREQUENCY: Primary | ICD-10-CM

## 2024-06-04 LAB
BILIRUB UR QL STRIP.AUTO: NEGATIVE
CLARITY UR REFRACT.AUTO: CLEAR
COLOR UR AUTO: COLORLESS
GLUCOSE UR STRIP.AUTO-MCNC: NORMAL MG/DL
KETONES UR STRIP.AUTO-MCNC: NEGATIVE MG/DL
LEUKOCYTE ESTERASE UR QL STRIP.AUTO: NEGATIVE
NITRITE UR QL STRIP.AUTO: NEGATIVE
PH UR STRIP.AUTO: 6.5 [PH] (ref 5–8)
PROT UR STRIP.AUTO-MCNC: NEGATIVE MG/DL
RBC UR QL AUTO: NEGATIVE
SP GR UR STRIP.AUTO: 1.01 (ref 1–1.03)
UROBILINOGEN UR STRIP.AUTO-MCNC: NORMAL MG/DL

## 2024-06-04 PROCEDURE — 81003 URINALYSIS AUTO W/O SCOPE: CPT | Performed by: EMERGENCY MEDICINE

## 2024-06-04 PROCEDURE — 99283 EMERGENCY DEPT VISIT LOW MDM: CPT

## 2024-06-04 NOTE — ED INITIAL ASSESSMENT (HPI)
Patient to ED c/o increased urinary frequency and decreased output.   started yesterday, did not sleep during the night.    No pain or fevers  Patient is Swiss speaking only, here with son and Granddaughter

## 2024-06-04 NOTE — ED PROVIDER NOTES
Patient Seen in: [facility] Emergency Department      History     Chief Complaint   Patient presents with    Urinary Symptoms     PER PATIENT'S SON PATIENT HAS BEEN HAVING INCREASED URINARY FREQUENCY, NO PAIN OR BLOOD IN URINE, NO FEVERS Polish SPEAKING ONLY     Stated Complaint: urinary symptoms    Subjective:   HPI    84-year-old male who presents to the emergency department with his family due to report that he has had frequent urination.  They state that he has had small amounts of urine output with frequent urination over the past 24 hours and did not sleep during the night because of his frequent episodes up to the bathroom.  There have been no reported fevers or vomiting.  The son tells me that his father initially complained of some discomfort when I asked the patient in Beninese, he denies having discomfort at this time.  Reviewing his records he was recently hospitalized on 5/25/2024 with a history of intracranial hemorrhage.  Reviewing the record he had had an intracranial hemorrhage 2 weeks prior when he was in [place] and the family then brought him here to [place] he was admitted at that time.  He does have a outpatient rehabilitation scheduled for tomorrow.  Family is here because of the frequent urination.    Objective:   Past Medical History:    Dementia (HCC)    Essential hypertension    Hyperlipidemia              Past Surgical History:   Procedure Laterality Date    Eeg phy/qhp ea incr w/veeg  5/27/2024                Social History     Socioeconomic History    Marital status:    Tobacco Use    Smoking status: Never    Smokeless tobacco: Never   Vaping Use    Vaping status: Never Used   Substance and Sexual Activity    Alcohol use: Never    Drug use: Never     Social Determinants of Health     Financial Resource Strain: Low Risk  (5/29/2024)    Financial Resource Strain     Difficulty of Paying Living Expenses: Not hard at all   Food Insecurity: No Food Insecurity (5/25/2024)     Food Insecurity     Food Insecurity: Never true   Transportation Needs: No Transportation Needs (5/29/2024)    Transportation Needs     Lack of Transportation: No    Received from [facility]    Social Connections   Housing Stability: Low Risk  (5/25/2024)    Housing Stability     Housing Instability: No              Review of Systems    Positive for stated complaint: urinary symptoms  Other systems are as noted in HPI.  Constitutional and vital signs reviewed.      All other systems reviewed and negative except as noted above.    Physical Exam     ED Triage Vitals [06/04/24 0805]   /54   Pulse 68   Resp 18   Temp 98.3 °F (36.8 °C)   Temp src Temporal   SpO2 95 %   O2 Device None (Room air)       Current Vitals:   Vital Signs  BP: 137/54  Pulse: 68  Resp: 18  Temp: 98.3 °F (36.8 °C)  Temp src: Temporal    Oxygen Therapy  SpO2: 95 %  O2 Device: None (Room air)            Physical Exam  General: 84-year-old male appears to be in no distress at his baseline mental status per the family's report.  HEENT: Pupils are reactive to light.  Oral mucosa moist tongue is midline.  Lungs: Equal breath sounds bilaterally no wheezes or rhonchi.  Bruise noted on the tip of his nose as well as his right cheek.  Cardiac: Heart rate of 70 normal S1 and S2 without murmurs or ectopy  Abdomen: Soft on exam.  No epigastric discomfort.  No suprapubic discomfort.  No CVA discomfort.  Extremities: Some bruises noted on the forearms bilaterally.    ED Course     Labs Reviewed   URINALYSIS, ROUTINE - Abnormal; Notable for the following components:       Result Value    Urine Color Colorless (*)     All other components within normal limits                      MDM    Differential diagnose includes but is not limited to cystitis, nonspecific urinary frequency, pyelonephritis, urethritis.  Patient will have urinalysis performed.  Urinalysis did not show any signs of infection.  The patient has dysuria nonspecific  etiology.  Follow-up with the primary care physician as an outpatient.  Return if symptoms progress or worsen.  May be neurologically mediated considering the patient's recent history of a bleed.    Note to Patient  The 21st Century Cures Act makes medical notes like these available to patients in the interest of transparency. However, be advised this is a medical document and is intended as [id number] communication; it is written in medical language and may appear blunt, direct, or contain abbreviations or verbiage that are unfamiliar. Medical documents are intended to carry relevant information, facts as evident, and the clinical opinion of the practitioner.  I have considered other serious etiologies for this patient's complaints, however the presentation is not consistent with such entities. Patient or caregiver understands the course of events that occurred in the emergency department. Instructed to return to emergency department or contact PCP for persistent, recurrent, or worsening symptoms.  ^^Please note that this report has been produced using speech recognition software and may contain errors related to that system including, but not limited to, errors in grammar, punctuation, and spelling, as well as words and phrases that possibly may have been recognized inappropriately.  If there are any questions or concerns, contact the dictating provider for clarification.                           Medical Decision Making      Disposition and Plan     Clinical Impression:  1. Urinary frequency         Disposition:  Discharge  6/4/2024 10:01 am    Follow-up:  Drew Myers  [address] [address] [phone]    Schedule an appointment as soon as possible for a visit in 2 day(s)            Medications Prescribed:  Current Discharge Medication List

## 2024-06-11 ENCOUNTER — HOSPITAL ENCOUNTER (OUTPATIENT)
Dept: CT IMAGING | Facility: HOSPITAL | Age: 84
Discharge: HOME | End: 2024-06-11
Payer: MEDICAID

## 2024-06-11 DIAGNOSIS — I62.9 INTRACRANIAL HEMORRHAGE (HCC): ICD-10-CM

## 2024-06-11 PROCEDURE — 70450 CT HEAD/BRAIN W/O DYE: CPT

## 2024-06-21 ENCOUNTER — OFFICE VISIT (OUTPATIENT)
Dept: SURGERY | Facility: CLINIC | Age: 84
End: 2024-06-21

## 2024-06-21 VITALS — HEART RATE: 64 BPM | SYSTOLIC BLOOD PRESSURE: 116 MMHG | DIASTOLIC BLOOD PRESSURE: 64 MMHG

## 2024-06-21 DIAGNOSIS — I62.9 INTRACRANIAL HEMORRHAGE (HCC): Primary | ICD-10-CM

## 2024-06-21 PROCEDURE — 99202 OFFICE O/P NEW SF 15 MIN: CPT | Performed by: NEUROLOGICAL SURGERY

## 2024-06-21 RX ORDER — TAMSULOSIN HYDROCHLORIDE 0.4 MG/1
0.8 CAPSULE ORAL DAILY
COMMUNITY
Start: 2024-06-17

## 2024-06-21 RX ORDER — TRAZODONE HYDROCHLORIDE 50 MG/1
25 TABLET ORAL NIGHTLY
COMMUNITY
Start: 2024-06-14

## 2024-06-21 RX ORDER — GLIPIZIDE 2.5 MG/1
2.5 TABLET, EXTENDED RELEASE ORAL
COMMUNITY
Start: 2024-02-14

## 2024-06-21 NOTE — H&P
[facility]  Neurological Surgery Clinic Note    REKHA Meza  5/15/1940  [id number]  PCP: MIR QUEZADA    REASON FOR VISIT:  Left frontal IPH    HISTORY OF PRESENT ILLNESS:  REKHA Meza is a 84 year old male who sustained a left frontal IPH in [place] in May 2024.  He then presented to [facility] after returning to [place].  MRI/MRA w/wo 5/28/24 demonstrated the resolving hemorrhage without underlying lesion.  Repeat head CT 6/11/24 demonstrated further improvement in the hematoma with reducing surrounding edema.  He is on aspirin for indications that his son is not aware.    PAST MEDICAL HISTORY:  Past Medical History:    Dementia (HCC)    Essential hypertension    Hyperlipidemia       PAST SURGICAL HISTORY:  Past Surgical History:   Procedure Laterality Date    Eeg phy/qhp ea incr w/veeg  5/27/2024       FAMILY HISTORY:  family history is not on file.    SOCIAL HISTORY:   reports that he has never smoked. He has never used smokeless tobacco. He reports that he does not drink alcohol and does not use drugs.    ALLERGIES:  No Known Allergies    MEDICATIONS:  Current Outpatient Medications on File Prior to Visit   Medication Sig Dispense Refill    diclofenac 1 % External Gel Apply 2 g topically 4 (four) times daily as needed.      tamsulosin 0.4 MG Oral Cap Take 2 capsules (0.8 mg total) by mouth daily.      traZODone 50 MG Oral Tab Take 0.5 tablets (25 mg total) by mouth nightly. AT BEDTIME      glipiZIDE ER 2.5 MG Oral Tablet 24 Hr Take 1 tablet (2.5 mg total) by mouth daily with breakfast.      losartan 50 MG Oral Tab Take 1 tablet (50 mg total) by mouth daily. 30 tablet 1    melatonin 1 MG Oral Tab Take 1 tablet (1 mg total) by mouth nightly. 30 tablet 1    levETIRAcetam 500 MG Oral Tab Take 1 tablet (500 mg total) by mouth 2 (two) times daily.      amLODIPine Besylate 2.5 MG Oral Tab Take 2 tablets (5 mg total) by mouth daily.      aspirin 81 MG Oral  Tab EC Take 1 tablet (81 mg total) by mouth daily.      Memantine HCl 10 MG Oral Tab Take 1 tablet (10 mg total) by mouth 2 (two) times daily.      atorvastatin 20 MG Oral Tab Take 1 tablet (20 mg total) by mouth nightly.      Metoprolol Succinate ER 25 MG Oral Tablet 24 Hr Take 1 tablet (25 mg total) by mouth daily.       No current facility-administered medications on file prior to visit.       REVIEW OF SYSTEMS:  A 10-point system was reviewed.  Pertinent positives and negatives are noted in HPI.      PHYSICAL EXAMINATION:  VITAL SIGNS: /64   Pulse 64     A&Ox1 (baseline)  PERRL, EOMi, FS  Full strength x 4, no drift  Sensation intact   Ambulates independently    ASSESSMENT:  83yo male with improving right frontal IPH    I spoke to the patient, his son, and his granddaughter about the current clinical situation. We discussed the signs and symptoms for which to seek medication attention. We reviewed the imaging together.    Plan:  - He may follow up with me as needed  - Need for aspirin should be re-evaluated by PCP and cardiology    Scott Rey MD  Neurological Surgery  [facility] Time: 20 min including face to face time, chart review, imaging interpretation, and coordination of care

## 2024-06-21 NOTE — PATIENT INSTRUCTIONS
Refill policies:    Allow 2-3 business days for refills; controlled substances may take longer.  Contact your pharmacy at least 5 days prior to running out of medication and have them send an electronic request or submit request through the “request refill” option in your [employer] account.  Refills are not addressed on weekends; covering physicians do not authorize routine medications on weekends.  No narcotics or controlled substances are refilled after noon on Fridays or by on call physicians.  By law, narcotics must be electronically prescribed.  A 30 day supply with no refills is the maximum allowed.  If your prescription is due for a refill, you may be due for a follow up appointment.  To best provide you care, patients receiving routine medications need to be seen at least once a year.  Patients receiving narcotic/controlled substance medications need to be seen at least once every 3 months.  In the event that your preferred pharmacy does not have the requested medication in stock (e.g. Backordered), it is your responsibility to find another pharmacy that has the requested medication available.  We will gladly send a new prescription to that pharmacy at your request.    Scheduling Tests:    If your physician has ordered radiology tests such as MRI or CT scans, please contact Central Scheduling at [phone] right away to schedule the test.  Once scheduled, the [facility] Centralized Referral Team will work with your insurance carrier to obtain pre-certification or prior authorization.  Depending on your insurance carrier, approval may take 3-10 days.  It is highly recommended patients assure they have received an authorization before having a test performed.  If test is done without insurance authorization, patient may be responsible for the entire amount billed.      Precertification and Prior Authorizations:  If your physician has recommended that you have a procedure or additional testing performed the [facility]  Centralized Referral Team will contact your insurance carrier to obtain pre-certification or prior authorization.    You are strongly encouraged to contact your insurance carrier to verify that your procedure/test has been approved and is a COVERED benefit.  Although the [facility] Centralized Referral Team does its due diligence, the insurance carrier gives the disclaimer that \"Although the procedure is authorized, this does not guarantee payment.\"    Ultimately the patient is responsible for payment.   Thank you for your understanding in this matter.  Paperwork Completion:  If you require FMLA or disability paperwork for your recovery, please make sure to either drop it off or have it faxed to our office at [phone]. Be sure the form has your name and date of birth on it.  The form will be faxed to our Forms Department and they will complete it for you.  There is a 25$ fee for all forms that need to be filled out.  Please be aware there is a 10-14 day turnaround time.  You will need to sign a release of information (TERESA) form if your paperwork does not come with one.  You may call the Forms Department with any questions at [phone].  Their fax number is [phone].

## (undated) NOTE — MR AVS SNAPSHOT
After Visit Summary   12/18/2020    Geoffrey Comment    MRN: [id number]           Visit Information     Date & Time  12/18/2020  2:00 PM Provider  Kevyn Maher NP Department  [facility]  Phone  [phone] I will call w/ lab results later today   F/u w/ [facility] system for PCP     Keep  Norvasc 2.5 mg   In am and pm    Wean Norco as pain decreases       Change to tylenol if needed for pain             December 18, 2020      [address] [place] [address] [facility] through [address] for adult and pediatric patients. Video Visits are available Monday - Friday for many common conditions such as allergies, colds, cough, fever, rash, sore throat, headache and pink eye.   The cost for a Video Vi [address]   Monday – Friday  4:00 pm – 10:00 pm   Saturday – Sunday  10:00 am – 4:00 pm  [facility]  Emergency Medicine Providers  Conditions needing urgent attention, but are   non-life-threatening.     Also available by appointment Average cost  $120*